# Patient Record
Sex: FEMALE | Race: BLACK OR AFRICAN AMERICAN | Employment: OTHER | URBAN - METROPOLITAN AREA
[De-identification: names, ages, dates, MRNs, and addresses within clinical notes are randomized per-mention and may not be internally consistent; named-entity substitution may affect disease eponyms.]

---

## 2021-03-05 NOTE — PAT NURSING NOTE
Patient agreed to have Type and screen done, but does not wish to receive any blood. Asked patient to call Dr. Kennedy Osvaldo office and inform them of her wish.

## 2021-03-07 ENCOUNTER — LAB ENCOUNTER (OUTPATIENT)
Dept: LAB | Facility: HOSPITAL | Age: 71
DRG: 472 | End: 2021-03-07
Attending: ORTHOPAEDIC SURGERY
Payer: MEDICARE

## 2021-03-07 DIAGNOSIS — Z01.818 PREOP TESTING: ICD-10-CM

## 2021-03-07 LAB
ANTIBODY SCREEN: POSITIVE
C ANTIGEN: NEGATIVE
DIRECT COOMBS POLY: NEGATIVE
E ANTIGEN: POSITIVE
FYA ANTIGEN: NEGATIVE
K ANTIGEN: NEGATIVE
LITTLE C ANTIGEN: POSITIVE
LITTLE E ANTIGEN: POSITIVE
M ANTIGEN: NEGATIVE
RH BLOOD TYPE: POSITIVE
S ANTIGEN: NEGATIVE

## 2021-03-07 PROCEDURE — 86905 BLOOD TYPING RBC ANTIGENS: CPT

## 2021-03-07 PROCEDURE — 86077 PHYS BLOOD BANK SERV XMATCH: CPT

## 2021-03-07 PROCEDURE — 86901 BLOOD TYPING SEROLOGIC RH(D): CPT

## 2021-03-07 PROCEDURE — 86870 RBC ANTIBODY IDENTIFICATION: CPT

## 2021-03-07 PROCEDURE — 36415 COLL VENOUS BLD VENIPUNCTURE: CPT

## 2021-03-07 PROCEDURE — 86900 BLOOD TYPING SEROLOGIC ABO: CPT

## 2021-03-07 PROCEDURE — 86850 RBC ANTIBODY SCREEN: CPT

## 2021-03-07 PROCEDURE — 86880 COOMBS TEST DIRECT: CPT

## 2021-03-08 LAB
RGTSCRN: 2
SARS-COV-2 RNA RESP QL NAA+PROBE: NOT DETECTED

## 2021-03-09 ENCOUNTER — ANESTHESIA EVENT (OUTPATIENT)
Dept: SURGERY | Facility: HOSPITAL | Age: 71
DRG: 472 | End: 2021-03-09
Payer: MEDICARE

## 2021-03-09 RX ORDER — OMEPRAZOLE 20 MG/1
40 CAPSULE, DELAYED RELEASE ORAL
COMMUNITY

## 2021-03-09 RX ORDER — METHOCARBAMOL 500 MG/1
500 TABLET, FILM COATED ORAL EVERY 8 HOURS PRN
Status: ON HOLD | COMMUNITY
End: 2021-03-13

## 2021-03-09 RX ORDER — GLUCOSAMINE HCL 500 MG
1 TABLET ORAL DAILY
COMMUNITY

## 2021-03-09 RX ORDER — INSULIN DEGLUDEC INJECTION 100 U/ML
8 INJECTION, SOLUTION SUBCUTANEOUS DAILY
Status: ON HOLD | COMMUNITY
End: 2021-03-19

## 2021-03-09 RX ORDER — LATANOPROST 50 UG/ML
1 SOLUTION/ DROPS OPHTHALMIC NIGHTLY
COMMUNITY

## 2021-03-10 ENCOUNTER — ANESTHESIA (OUTPATIENT)
Dept: SURGERY | Facility: HOSPITAL | Age: 71
DRG: 472 | End: 2021-03-10
Payer: MEDICARE

## 2021-03-10 ENCOUNTER — APPOINTMENT (OUTPATIENT)
Dept: GENERAL RADIOLOGY | Facility: HOSPITAL | Age: 71
DRG: 472 | End: 2021-03-10
Attending: ORTHOPAEDIC SURGERY
Payer: MEDICARE

## 2021-03-10 ENCOUNTER — HOSPITAL ENCOUNTER (INPATIENT)
Facility: HOSPITAL | Age: 71
LOS: 14 days | Discharge: NURSING, REHAB OR LONG-TERM CARE | DRG: 472 | End: 2021-03-24
Attending: ORTHOPAEDIC SURGERY | Admitting: ORTHOPAEDIC SURGERY
Payer: MEDICARE

## 2021-03-10 DIAGNOSIS — G95.9 CERVICAL MYELOPATHY (HCC): ICD-10-CM

## 2021-03-10 PROBLEM — M48.02 SPINAL STENOSIS IN CERVICAL REGION: Status: ACTIVE | Noted: 2021-03-10

## 2021-03-10 PROBLEM — M48.00 SPINAL STENOSIS: Status: ACTIVE | Noted: 2021-03-10

## 2021-03-10 LAB
ANION GAP SERPL CALC-SCNC: 5 MMOL/L (ref 0–18)
BUN BLD-MCNC: 17 MG/DL (ref 7–18)
BUN/CREAT SERPL: 17.3 (ref 10–20)
CALCIUM BLD-MCNC: 8 MG/DL (ref 8.5–10.1)
CHLORIDE SERPL-SCNC: 110 MMOL/L (ref 98–112)
CO2 SERPL-SCNC: 25 MMOL/L (ref 21–32)
CREAT BLD-MCNC: 0.98 MG/DL
DEPRECATED RDW RBC AUTO: 38 FL (ref 35.1–46.3)
ERYTHROCYTE [DISTWIDTH] IN BLOOD BY AUTOMATED COUNT: 11.3 % (ref 11–15)
EST. AVERAGE GLUCOSE BLD GHB EST-MCNC: 146 MG/DL (ref 68–126)
GLUCOSE BLD-MCNC: 138 MG/DL (ref 70–99)
GLUCOSE BLDC GLUCOMTR-MCNC: 122 MG/DL (ref 70–99)
GLUCOSE BLDC GLUCOMTR-MCNC: 144 MG/DL (ref 70–99)
GLUCOSE BLDC GLUCOMTR-MCNC: 146 MG/DL (ref 70–99)
GLUCOSE BLDC GLUCOMTR-MCNC: 188 MG/DL (ref 70–99)
GLUCOSE BLDC GLUCOMTR-MCNC: 193 MG/DL (ref 70–99)
GLUCOSE BLDC GLUCOMTR-MCNC: 200 MG/DL (ref 70–99)
HBA1C MFR BLD HPLC: 6.7 % (ref ?–5.7)
HCT VFR BLD AUTO: 27.3 %
HGB BLD-MCNC: 9.2 G/DL
MCH RBC QN AUTO: 31.6 PG (ref 26–34)
MCHC RBC AUTO-ENTMCNC: 33.7 G/DL (ref 31–37)
MCV RBC AUTO: 93.8 FL
OSMOLALITY SERPL CALC.SUM OF ELEC: 294 MOSM/KG (ref 275–295)
PLATELET # BLD AUTO: 258 10(3)UL (ref 150–450)
POCT HEMOCUE: 7.2 (ref 12–16)
POCT HEMOCUE: 8.1 (ref 12–16)
POCT HEMOCUE: 8.4 (ref 12–16)
POTASSIUM SERPL-SCNC: 4.1 MMOL/L (ref 3.5–5.1)
RBC # BLD AUTO: 2.91 X10(6)UL
SODIUM SERPL-SCNC: 140 MMOL/L (ref 136–145)
WBC # BLD AUTO: 4.9 X10(3) UL (ref 4–11)

## 2021-03-10 PROCEDURE — 85027 COMPLETE CBC AUTOMATED: CPT | Performed by: ORTHOPAEDIC SURGERY

## 2021-03-10 PROCEDURE — 0RG2071 FUSION OF 2 OR MORE CERVICAL VERTEBRAL JOINTS WITH AUTOLOGOUS TISSUE SUBSTITUTE, POSTERIOR APPROACH, POSTERIOR COLUMN, OPEN APPROACH: ICD-10-PCS | Performed by: ORTHOPAEDIC SURGERY

## 2021-03-10 PROCEDURE — 82962 GLUCOSE BLOOD TEST: CPT

## 2021-03-10 PROCEDURE — 4A11X4G MONITORING OF PERIPHERAL NERVOUS ELECTRICAL ACTIVITY, INTRAOPERATIVE, EXTERNAL APPROACH: ICD-10-PCS | Performed by: ORTHOPAEDIC SURGERY

## 2021-03-10 PROCEDURE — 95939 C MOTOR EVOKED UPR&LWR LIMBS: CPT | Performed by: ORTHOPAEDIC SURGERY

## 2021-03-10 PROCEDURE — 86922 COMPATIBILITY TEST ANTIGLOB: CPT

## 2021-03-10 PROCEDURE — 95938 SOMATOSENSORY TESTING: CPT | Performed by: ORTHOPAEDIC SURGERY

## 2021-03-10 PROCEDURE — 30233N1 TRANSFUSION OF NONAUTOLOGOUS RED BLOOD CELLS INTO PERIPHERAL VEIN, PERCUTANEOUS APPROACH: ICD-10-PCS | Performed by: ANESTHESIOLOGY

## 2021-03-10 PROCEDURE — 36430 TRANSFUSION BLD/BLD COMPNT: CPT | Performed by: ANESTHESIOLOGY

## 2021-03-10 PROCEDURE — C1713 ANCHOR/SCREW BN/BN,TIS/BN: HCPCS | Performed by: ORTHOPAEDIC SURGERY

## 2021-03-10 PROCEDURE — 83036 HEMOGLOBIN GLYCOSYLATED A1C: CPT | Performed by: ORTHOPAEDIC SURGERY

## 2021-03-10 PROCEDURE — 80048 BASIC METABOLIC PNL TOTAL CA: CPT | Performed by: ORTHOPAEDIC SURGERY

## 2021-03-10 PROCEDURE — 00NW0ZZ RELEASE CERVICAL SPINAL CORD, OPEN APPROACH: ICD-10-PCS | Performed by: ORTHOPAEDIC SURGERY

## 2021-03-10 PROCEDURE — 76000 FLUOROSCOPY <1 HR PHYS/QHP: CPT | Performed by: ORTHOPAEDIC SURGERY

## 2021-03-10 DEVICE — IMPLANTABLE DEVICE: Type: IMPLANTABLE DEVICE | Status: FUNCTIONAL

## 2021-03-10 RX ORDER — HYDROMORPHONE HYDROCHLORIDE 1 MG/ML
0.4 INJECTION, SOLUTION INTRAMUSCULAR; INTRAVENOUS; SUBCUTANEOUS EVERY 2 HOUR PRN
Status: DISCONTINUED | OUTPATIENT
Start: 2021-03-10 | End: 2021-03-11

## 2021-03-10 RX ORDER — SODIUM CHLORIDE, SODIUM LACTATE, POTASSIUM CHLORIDE, CALCIUM CHLORIDE 600; 310; 30; 20 MG/100ML; MG/100ML; MG/100ML; MG/100ML
INJECTION, SOLUTION INTRAVENOUS CONTINUOUS
Status: DISCONTINUED | OUTPATIENT
Start: 2021-03-10 | End: 2021-03-10 | Stop reason: ALTCHOICE

## 2021-03-10 RX ORDER — HYDROMORPHONE HYDROCHLORIDE 1 MG/ML
0.6 INJECTION, SOLUTION INTRAMUSCULAR; INTRAVENOUS; SUBCUTANEOUS EVERY 5 MIN PRN
Status: DISCONTINUED | OUTPATIENT
Start: 2021-03-10 | End: 2021-03-10 | Stop reason: HOSPADM

## 2021-03-10 RX ORDER — DEXTROSE MONOHYDRATE 25 G/50ML
50 INJECTION, SOLUTION INTRAVENOUS
Status: DISCONTINUED | OUTPATIENT
Start: 2021-03-10 | End: 2021-03-10 | Stop reason: HOSPADM

## 2021-03-10 RX ORDER — DIPHENHYDRAMINE HCL 25 MG
25 CAPSULE ORAL EVERY 4 HOURS PRN
Status: DISCONTINUED | OUTPATIENT
Start: 2021-03-10 | End: 2021-03-24

## 2021-03-10 RX ORDER — SODIUM CHLORIDE, SODIUM LACTATE, POTASSIUM CHLORIDE, CALCIUM CHLORIDE 600; 310; 30; 20 MG/100ML; MG/100ML; MG/100ML; MG/100ML
INJECTION, SOLUTION INTRAVENOUS CONTINUOUS
Status: DISCONTINUED | OUTPATIENT
Start: 2021-03-10 | End: 2021-03-10 | Stop reason: HOSPADM

## 2021-03-10 RX ORDER — GLYCOPYRROLATE 0.2 MG/ML
INJECTION, SOLUTION INTRAMUSCULAR; INTRAVENOUS AS NEEDED
Status: DISCONTINUED | OUTPATIENT
Start: 2021-03-10 | End: 2021-03-10 | Stop reason: SURG

## 2021-03-10 RX ORDER — INSULIN ASPART 100 [IU]/ML
INJECTION, SOLUTION INTRAVENOUS; SUBCUTANEOUS AS NEEDED
Status: DISCONTINUED | OUTPATIENT
Start: 2021-03-10 | End: 2021-03-10

## 2021-03-10 RX ORDER — HYDROMORPHONE HYDROCHLORIDE 1 MG/ML
0.8 INJECTION, SOLUTION INTRAMUSCULAR; INTRAVENOUS; SUBCUTANEOUS EVERY 2 HOUR PRN
Status: DISCONTINUED | OUTPATIENT
Start: 2021-03-10 | End: 2021-03-11

## 2021-03-10 RX ORDER — DEXAMETHASONE SODIUM PHOSPHATE 4 MG/ML
VIAL (ML) INJECTION AS NEEDED
Status: DISCONTINUED | OUTPATIENT
Start: 2021-03-10 | End: 2021-03-10 | Stop reason: SURG

## 2021-03-10 RX ORDER — MELATONIN
325
Status: DISCONTINUED | OUTPATIENT
Start: 2021-03-11 | End: 2021-03-24

## 2021-03-10 RX ORDER — PROCHLORPERAZINE EDISYLATE 5 MG/ML
5 INJECTION INTRAMUSCULAR; INTRAVENOUS ONCE AS NEEDED
Status: DISCONTINUED | OUTPATIENT
Start: 2021-03-10 | End: 2021-03-10 | Stop reason: HOSPADM

## 2021-03-10 RX ORDER — MECLIZINE HCL 12.5 MG/1
12.5 TABLET ORAL 3 TIMES DAILY PRN
Status: DISCONTINUED | OUTPATIENT
Start: 2021-03-10 | End: 2021-03-24

## 2021-03-10 RX ORDER — NALOXONE HYDROCHLORIDE 0.4 MG/ML
80 INJECTION, SOLUTION INTRAMUSCULAR; INTRAVENOUS; SUBCUTANEOUS AS NEEDED
Status: DISCONTINUED | OUTPATIENT
Start: 2021-03-10 | End: 2021-03-10 | Stop reason: HOSPADM

## 2021-03-10 RX ORDER — CEFAZOLIN SODIUM/WATER 2 G/20 ML
2 SYRINGE (ML) INTRAVENOUS EVERY 8 HOURS
Status: COMPLETED | OUTPATIENT
Start: 2021-03-10 | End: 2021-03-11

## 2021-03-10 RX ORDER — INSULIN ASPART 100 [IU]/ML
5 INJECTION, SOLUTION INTRAVENOUS; SUBCUTANEOUS
Status: DISCONTINUED | OUTPATIENT
Start: 2021-03-10 | End: 2021-03-10 | Stop reason: ALTCHOICE

## 2021-03-10 RX ORDER — DOCUSATE SODIUM 100 MG/1
100 CAPSULE, LIQUID FILLED ORAL 2 TIMES DAILY
Status: DISCONTINUED | OUTPATIENT
Start: 2021-03-10 | End: 2021-03-24

## 2021-03-10 RX ORDER — LATANOPROST 50 UG/ML
1 SOLUTION/ DROPS OPHTHALMIC NIGHTLY
Status: DISCONTINUED | OUTPATIENT
Start: 2021-03-10 | End: 2021-03-13

## 2021-03-10 RX ORDER — ROCURONIUM BROMIDE 10 MG/ML
INJECTION, SOLUTION INTRAVENOUS AS NEEDED
Status: DISCONTINUED | OUTPATIENT
Start: 2021-03-10 | End: 2021-03-10 | Stop reason: SURG

## 2021-03-10 RX ORDER — PROCHLORPERAZINE EDISYLATE 5 MG/ML
10 INJECTION INTRAMUSCULAR; INTRAVENOUS EVERY 6 HOURS PRN
Status: ACTIVE | OUTPATIENT
Start: 2021-03-10 | End: 2021-03-12

## 2021-03-10 RX ORDER — DEXTROSE MONOHYDRATE 25 G/50ML
50 INJECTION, SOLUTION INTRAVENOUS
Status: DISCONTINUED | OUTPATIENT
Start: 2021-03-10 | End: 2021-03-24

## 2021-03-10 RX ORDER — HYDROCODONE BITARTRATE AND ACETAMINOPHEN 5; 325 MG/1; MG/1
1 TABLET ORAL AS NEEDED
Status: DISCONTINUED | OUTPATIENT
Start: 2021-03-10 | End: 2021-03-10 | Stop reason: HOSPADM

## 2021-03-10 RX ORDER — ACETAMINOPHEN 325 MG/1
650 TABLET ORAL EVERY 4 HOURS PRN
Status: DISCONTINUED | OUTPATIENT
Start: 2021-03-10 | End: 2021-03-11

## 2021-03-10 RX ORDER — BISACODYL 10 MG
10 SUPPOSITORY, RECTAL RECTAL
Status: DISCONTINUED | OUTPATIENT
Start: 2021-03-10 | End: 2021-03-24

## 2021-03-10 RX ORDER — ONDANSETRON 2 MG/ML
INJECTION INTRAMUSCULAR; INTRAVENOUS AS NEEDED
Status: DISCONTINUED | OUTPATIENT
Start: 2021-03-10 | End: 2021-03-10 | Stop reason: SURG

## 2021-03-10 RX ORDER — SODIUM PHOSPHATE, DIBASIC AND SODIUM PHOSPHATE, MONOBASIC 7; 19 G/133ML; G/133ML
1 ENEMA RECTAL ONCE AS NEEDED
Status: DISCONTINUED | OUTPATIENT
Start: 2021-03-10 | End: 2021-03-24

## 2021-03-10 RX ORDER — HYDROCODONE BITARTRATE AND ACETAMINOPHEN 5; 325 MG/1; MG/1
2 TABLET ORAL AS NEEDED
Status: DISCONTINUED | OUTPATIENT
Start: 2021-03-10 | End: 2021-03-10 | Stop reason: HOSPADM

## 2021-03-10 RX ORDER — MIDAZOLAM HYDROCHLORIDE 1 MG/ML
INJECTION INTRAMUSCULAR; INTRAVENOUS AS NEEDED
Status: DISCONTINUED | OUTPATIENT
Start: 2021-03-10 | End: 2021-03-10 | Stop reason: SURG

## 2021-03-10 RX ORDER — VANCOMYCIN HYDROCHLORIDE 1 G/20ML
INJECTION, POWDER, LYOPHILIZED, FOR SOLUTION INTRAVENOUS AS NEEDED
Status: DISCONTINUED | OUTPATIENT
Start: 2021-03-10 | End: 2021-03-10

## 2021-03-10 RX ORDER — HYDROMORPHONE HYDROCHLORIDE 1 MG/ML
0.2 INJECTION, SOLUTION INTRAMUSCULAR; INTRAVENOUS; SUBCUTANEOUS EVERY 2 HOUR PRN
Status: DISCONTINUED | OUTPATIENT
Start: 2021-03-10 | End: 2021-03-11

## 2021-03-10 RX ORDER — OXYCODONE HYDROCHLORIDE AND ACETAMINOPHEN 5; 325 MG/1; MG/1
2 TABLET ORAL EVERY 4 HOURS PRN
Status: DISCONTINUED | OUTPATIENT
Start: 2021-03-10 | End: 2021-03-11

## 2021-03-10 RX ORDER — SODIUM CHLORIDE 9 MG/ML
INJECTION, SOLUTION INTRAVENOUS CONTINUOUS PRN
Status: DISCONTINUED | OUTPATIENT
Start: 2021-03-10 | End: 2021-03-10 | Stop reason: SURG

## 2021-03-10 RX ORDER — ONDANSETRON 2 MG/ML
4 INJECTION INTRAMUSCULAR; INTRAVENOUS EVERY 4 HOURS PRN
Status: DISPENSED | OUTPATIENT
Start: 2021-03-10 | End: 2021-03-12

## 2021-03-10 RX ORDER — LIDOCAINE HYDROCHLORIDE 10 MG/ML
INJECTION, SOLUTION EPIDURAL; INFILTRATION; INTRACAUDAL; PERINEURAL AS NEEDED
Status: DISCONTINUED | OUTPATIENT
Start: 2021-03-10 | End: 2021-03-10 | Stop reason: SURG

## 2021-03-10 RX ORDER — HYDROMORPHONE HYDROCHLORIDE 1 MG/ML
0.4 INJECTION, SOLUTION INTRAMUSCULAR; INTRAVENOUS; SUBCUTANEOUS EVERY 5 MIN PRN
Status: DISCONTINUED | OUTPATIENT
Start: 2021-03-10 | End: 2021-03-10 | Stop reason: HOSPADM

## 2021-03-10 RX ORDER — BUPIVACAINE HYDROCHLORIDE 5 MG/ML
INJECTION, SOLUTION EPIDURAL; INTRACAUDAL AS NEEDED
Status: DISCONTINUED | OUTPATIENT
Start: 2021-03-10 | End: 2021-03-10

## 2021-03-10 RX ORDER — BACLOFEN 10 MG/1
10 TABLET ORAL EVERY 8 HOURS PRN
Status: DISCONTINUED | OUTPATIENT
Start: 2021-03-10 | End: 2021-03-24

## 2021-03-10 RX ORDER — ONDANSETRON 2 MG/ML
4 INJECTION INTRAMUSCULAR; INTRAVENOUS ONCE AS NEEDED
Status: DISCONTINUED | OUTPATIENT
Start: 2021-03-10 | End: 2021-03-10 | Stop reason: HOSPADM

## 2021-03-10 RX ORDER — PHENYLEPHRINE HCL 10 MG/ML
VIAL (ML) INJECTION AS NEEDED
Status: DISCONTINUED | OUTPATIENT
Start: 2021-03-10 | End: 2021-03-10 | Stop reason: SURG

## 2021-03-10 RX ORDER — ACETAMINOPHEN 500 MG
1000 TABLET ORAL ONCE
Status: COMPLETED | OUTPATIENT
Start: 2021-03-10 | End: 2021-03-10

## 2021-03-10 RX ORDER — HYDROMORPHONE HYDROCHLORIDE 1 MG/ML
0.2 INJECTION, SOLUTION INTRAMUSCULAR; INTRAVENOUS; SUBCUTANEOUS EVERY 5 MIN PRN
Status: DISCONTINUED | OUTPATIENT
Start: 2021-03-10 | End: 2021-03-10 | Stop reason: HOSPADM

## 2021-03-10 RX ORDER — DIPHENHYDRAMINE HYDROCHLORIDE 50 MG/ML
25 INJECTION INTRAMUSCULAR; INTRAVENOUS EVERY 4 HOURS PRN
Status: DISCONTINUED | OUTPATIENT
Start: 2021-03-10 | End: 2021-03-24

## 2021-03-10 RX ORDER — POLYETHYLENE GLYCOL 3350 17 G/17G
17 POWDER, FOR SOLUTION ORAL DAILY PRN
Status: DISCONTINUED | OUTPATIENT
Start: 2021-03-10 | End: 2021-03-24

## 2021-03-10 RX ORDER — PANTOPRAZOLE SODIUM 20 MG/1
20 TABLET, DELAYED RELEASE ORAL
Status: DISCONTINUED | OUTPATIENT
Start: 2021-03-11 | End: 2021-03-24

## 2021-03-10 RX ORDER — SENNOSIDES 8.6 MG
17.2 TABLET ORAL NIGHTLY
Status: DISCONTINUED | OUTPATIENT
Start: 2021-03-10 | End: 2021-03-24

## 2021-03-10 RX ORDER — LIDOCAINE HYDROCHLORIDE AND EPINEPHRINE 10; 10 MG/ML; UG/ML
INJECTION, SOLUTION INFILTRATION; PERINEURAL AS NEEDED
Status: DISCONTINUED | OUTPATIENT
Start: 2021-03-10 | End: 2021-03-10

## 2021-03-10 RX ORDER — CEFAZOLIN SODIUM/WATER 2 G/20 ML
2 SYRINGE (ML) INTRAVENOUS ONCE
Status: COMPLETED | OUTPATIENT
Start: 2021-03-10 | End: 2021-03-10

## 2021-03-10 RX ORDER — SCOLOPAMINE TRANSDERMAL SYSTEM 1 MG/1
1 PATCH, EXTENDED RELEASE TRANSDERMAL ONCE
Status: DISCONTINUED | OUTPATIENT
Start: 2021-03-10 | End: 2021-03-13

## 2021-03-10 RX ORDER — SODIUM CHLORIDE, SODIUM LACTATE, POTASSIUM CHLORIDE, CALCIUM CHLORIDE 600; 310; 30; 20 MG/100ML; MG/100ML; MG/100ML; MG/100ML
INJECTION, SOLUTION INTRAVENOUS CONTINUOUS
Status: DISCONTINUED | OUTPATIENT
Start: 2021-03-10 | End: 2021-03-12

## 2021-03-10 RX ORDER — OXYCODONE HYDROCHLORIDE AND ACETAMINOPHEN 5; 325 MG/1; MG/1
1 TABLET ORAL EVERY 4 HOURS PRN
Status: DISCONTINUED | OUTPATIENT
Start: 2021-03-10 | End: 2021-03-11

## 2021-03-10 RX ADMIN — PHENYLEPHRINE HCL 50 MCG: 10 MG/ML VIAL (ML) INJECTION at 09:00:00

## 2021-03-10 RX ADMIN — PHENYLEPHRINE HCL 100 MCG: 10 MG/ML VIAL (ML) INJECTION at 07:59:00

## 2021-03-10 RX ADMIN — SODIUM CHLORIDE: 9 INJECTION, SOLUTION INTRAVENOUS at 10:30:00

## 2021-03-10 RX ADMIN — SODIUM CHLORIDE, SODIUM LACTATE, POTASSIUM CHLORIDE, CALCIUM CHLORIDE: 600; 310; 30; 20 INJECTION, SOLUTION INTRAVENOUS at 09:23:00

## 2021-03-10 RX ADMIN — PHENYLEPHRINE HCL 50 MCG: 10 MG/ML VIAL (ML) INJECTION at 08:12:00

## 2021-03-10 RX ADMIN — LIDOCAINE HYDROCHLORIDE 50 MG: 10 INJECTION, SOLUTION EPIDURAL; INFILTRATION; INTRACAUDAL; PERINEURAL at 07:45:00

## 2021-03-10 RX ADMIN — DEXAMETHASONE SODIUM PHOSPHATE 4 MG: 4 MG/ML VIAL (ML) INJECTION at 07:59:00

## 2021-03-10 RX ADMIN — SODIUM CHLORIDE: 9 INJECTION, SOLUTION INTRAVENOUS at 11:36:00

## 2021-03-10 RX ADMIN — ROCURONIUM BROMIDE 5 MG: 10 INJECTION, SOLUTION INTRAVENOUS at 07:45:00

## 2021-03-10 RX ADMIN — SODIUM CHLORIDE, SODIUM LACTATE, POTASSIUM CHLORIDE, CALCIUM CHLORIDE: 600; 310; 30; 20 INJECTION, SOLUTION INTRAVENOUS at 11:36:00

## 2021-03-10 RX ADMIN — SODIUM CHLORIDE: 9 INJECTION, SOLUTION INTRAVENOUS at 07:55:00

## 2021-03-10 RX ADMIN — CEFAZOLIN SODIUM/WATER 2 G: 2 G/20 ML SYRINGE (ML) INTRAVENOUS at 11:58:00

## 2021-03-10 RX ADMIN — MIDAZOLAM HYDROCHLORIDE 2 MG: 1 INJECTION INTRAMUSCULAR; INTRAVENOUS at 07:39:00

## 2021-03-10 RX ADMIN — PHENYLEPHRINE HCL 50 MCG: 10 MG/ML VIAL (ML) INJECTION at 11:03:00

## 2021-03-10 RX ADMIN — PHENYLEPHRINE HCL 50 MCG: 10 MG/ML VIAL (ML) INJECTION at 09:22:00

## 2021-03-10 RX ADMIN — GLYCOPYRROLATE 0.1 MG: 0.2 INJECTION, SOLUTION INTRAMUSCULAR; INTRAVENOUS at 07:45:00

## 2021-03-10 RX ADMIN — SODIUM CHLORIDE, SODIUM LACTATE, POTASSIUM CHLORIDE, CALCIUM CHLORIDE: 600; 310; 30; 20 INJECTION, SOLUTION INTRAVENOUS at 07:36:00

## 2021-03-10 RX ADMIN — CEFAZOLIN SODIUM/WATER 2 G: 2 G/20 ML SYRINGE (ML) INTRAVENOUS at 08:00:00

## 2021-03-10 RX ADMIN — ONDANSETRON 4 MG: 2 INJECTION INTRAMUSCULAR; INTRAVENOUS at 07:59:00

## 2021-03-10 RX ADMIN — PHENYLEPHRINE HCL 100 MCG: 10 MG/ML VIAL (ML) INJECTION at 08:15:00

## 2021-03-10 NOTE — PHYSICAL THERAPY NOTE
Orders received, chart reviewed. Attempted to see pt for PT eval. Pt greeted in bed with RN present. Pt lethargic and in pain, unable to participate in therapy at this time. Will f/u tomorrow for PT eval as appropriate.     Aaliyah Melchor, DPT  Physical Th

## 2021-03-10 NOTE — PAYOR COMM NOTE
--------------  ADMISSION REVIEW     Silvia Walker MA [facility]  Subscriber #:  [id number]  Authorization Number: [id number]    Admit date: 3/10/21  Admit time:  5:53 AM     Admitting Physician: Adilene Sheridan MD  Attending Physician:  Adilene Sheridan MD  Primary Car plan as detailed above.  Discussed plan of care with Dr. Ramonia Kayser RN, NP  3/10/2021    HISTORY:   CC: consult from Dr Mary Kate Hood for post op medical management      History of Present Illness:  Hx obtained via chart review  79 yo female with hx gastric cervical stenosis/myelopathy who is S/P C4-T1 laminectomy and fusion.  Post op course with acute on chronic anemia, S/P 1 unit PRBC's in OR, post op groggy, notes pain in neck, following commands, no chest pain, no nausea or vomiting    objective  /84 neuromonitoring throughout case.     Specimen: none     Estimated Blood Loss: Blood Output: 800 mL (3/10/2021 11:35 AM)    Chris Vanegas MD  3/10/2021  11:58 AM    Anesthesia Plan:   ASA:  3  Plan:   General  Monitors and Lines:   A-line, BIS and Additonal Date Action Dose Route User    3/10/2021 1044 Given 3 Units Intravenous Analia Helton CRNA      lactated ringers infusion     Date Action Dose Route User    3/10/2021 9508 New Bag (none) Intravenous Analia Helton CRNA    3/10/2021 [address] Rate/Dose Change 120 mcg/kg/min × 71.7 kg Intravenous Analia Danie, CRNA    3/10/2021 5025 Rate/Dose Change 100 mcg/kg/min × 71.7 kg Intravenous Analia Danie, CRNA    3/10/2021 0809 Rate/Dose Change 70 mcg/kg/min × 71.7 kg Intravenous Joanne Santa,

## 2021-03-10 NOTE — ANESTHESIA PREPROCEDURE EVALUATION
Anesthesia PreOp Note    HPI:     Beto Yip is a 78year old female who presents for preoperative consultation requested by: Rex Quinn MD    Date of Surgery: 3/10/2021    Procedure(s):  Cervical 4 -Thoracic 1 laminectomy & posterior spinal fusio Disp: , Rfl: , 3/5/2021  Chlorhexidine Gluconate 4 % External Liquid, Wash with chlorhexidine gluconate 4% at the surgical site 5-7 days prior to surgery.  Posterior upper back and neck, Disp: 1 Bottle, Rfl: 0  Insulin Aspart Pen 100 UNIT/ML Subcutaneous So Socioeconomic History      Marital status:        Spouse name: Not on file      Number of children: Not on file      Years of education: Not on file      Highest education level: Not on file    Occupational History      Not on file    Tobacco Use 78. Her respiration is 18 and oxygen saturation is 100%.     03/09/21  1146 03/10/21  0608   BP:  138/76   Pulse:  76   Resp:  18   Temp:  98.1 °F (36.7 °C)   TempSrc:  Oral   SpO2:  100%   Weight: 70.8 kg (156 lb) 71.7 kg (158 lb)   Height: 1.702 m (5' 7\"

## 2021-03-10 NOTE — ANESTHESIA PROCEDURE NOTES
Airway  Date/Time: 3/10/2021 7:47 AM  Urgency: Elective    Airway not difficult    General Information and Staff    Patient location during procedure: OR  Anesthesiologist: Hugh Kincaid MD  Resident/CRNA: Lizzie Maria CRNA  Performed: CRNA

## 2021-03-10 NOTE — H&P
[facility] Hospitalist Team  Consult  Admit Date:  3/10/21    ASSESSMENT / PLAN:   79 yo female with hx gastric bypass, b12 def, anemia, HL-statin intolerant, GERD, OAB, DM type II and severe cervical stenosis/myelopathy who is S/P C4-T1 laminectomy and fusion.  P arterial line but not to command. Pt got 1 unit of blood in OR for hgb 7.2, current hgb 9.4. Unable to get rest of hx/ROS due to cognitive state.      OBJECTIVE:  /76 (BP Location: Right arm)   Pulse 76   Temp 98.1 °F (36.7 °C) (Oral)   Resp 18   Ht 5 eyes nightly., Disp: , Rfl:   omeprazole 20 MG Oral Capsule Delayed Release, Take 40 mg by mouth every morning before breakfast., Disp: , Rfl:   Prenatal Vit-Fe Fumarate-FA (PRENATAL VITAMIN PLUS LOW IRON OR), Take 1 tablet by mouth daily. , Disp: , Rfl: and severe cervical stenosis/myelopathy who is S/P C4-T1 laminectomy and fusion.  Post op course with acute on chronic anemia, S/P 1 unit PRBC's in OR, post op groggy, notes pain in neck, following commands, no chest pain, no nausea or vomiting    objective

## 2021-03-10 NOTE — ANESTHESIA POSTPROCEDURE EVALUATION
Patient: Marlon Vasquez    Procedure Summary     Date: 03/10/21 Room / Location: [address] [place] [address] MAIN OR 11 / [address] [facility] MAIN OR    Anesthesia Start: 1949 Anesthesia Stop: 7749    Procedure: Cervical 4 -Thoracic 1 laminectomy & posterior spinal fusion (N/A Spine Cervical)

## 2021-03-10 NOTE — BRIEF OP NOTE
Pre-Operative Diagnosis: Cervical myelopathy (HCC) [G95.9]     Post-Operative Diagnosis: same     Procedure Performed:   Procedure(s):  Cervical 4 -Thoracic 1 laminectomy & posterior spinal fusion    Surgeon(s) and Role:     * Sara Stanton MD - Primary

## 2021-03-10 NOTE — H&P
CC:Symptomatic and progressive advanced myelopathy with severe cervical spinal stenosis     HPI: Milvia Merlos 78year old female reports symptomatic and progressive advanced myelopathy with severe cervical spinal stenosis.  Continued left upper extremit on file      Comment: rare    Drug use: Never         A comprehensive 10 point review of systems was completed.   Pertinent positives and negatives noted in the the HPI.     03/10/21  0608   BP: 138/76   Pulse: 76   Resp: 18   Temp: 98.1 °F (36.7 °C)

## 2021-03-10 NOTE — ANESTHESIA PROCEDURE NOTES
Arterial Line  Performed by: Dhiraj Sorenson CRNA  Authorized by: Wilton Kincaid MD     General Information and Staff    Procedure Start:  3/10/2021 7:50 AM  Procedure End:  3/10/2021 7:52 AM  Anesthesiologist:  Wilton Kincaid MD  CRNA:  Rosa Seth

## 2021-03-10 NOTE — PLAN OF CARE
Patient is alert and oriented X 2-3 after orientation. VS are WNL. She has hx of TANIA, tele in place. She is up as tolerated but resting comfortably in bed. Has c-collar in place and to remain on at all times per order.  Dressing is CDI and hemovac in place Progressing     Problem: Delirium  Goal: Minimize duration of delirium  Description: Interventions:  - Encourage use of hearing aids, eye glasses  - Promote highest level of mobility daily  - Provide frequent reorientation  - Promote wakefulness i.e. light

## 2021-03-11 ENCOUNTER — APPOINTMENT (OUTPATIENT)
Dept: GENERAL RADIOLOGY | Facility: HOSPITAL | Age: 71
DRG: 472 | End: 2021-03-11
Attending: ORTHOPAEDIC SURGERY
Payer: MEDICARE

## 2021-03-11 LAB
ANION GAP SERPL CALC-SCNC: 4 MMOL/L (ref 0–18)
BLOOD TYPE BARCODE: 5100
BUN BLD-MCNC: 14 MG/DL (ref 7–18)
BUN/CREAT SERPL: 14.6 (ref 10–20)
CALCIUM BLD-MCNC: 8.3 MG/DL (ref 8.5–10.1)
CHLORIDE SERPL-SCNC: 107 MMOL/L (ref 98–112)
CO2 SERPL-SCNC: 29 MMOL/L (ref 21–32)
CREAT BLD-MCNC: 0.96 MG/DL
DEPRECATED RDW RBC AUTO: 37.9 FL (ref 35.1–46.3)
ERYTHROCYTE [DISTWIDTH] IN BLOOD BY AUTOMATED COUNT: 11.1 % (ref 11–15)
EST. AVERAGE GLUCOSE BLD GHB EST-MCNC: 146 MG/DL (ref 68–126)
GLUCOSE BLD-MCNC: 114 MG/DL (ref 70–99)
GLUCOSE BLDC GLUCOMTR-MCNC: 163 MG/DL (ref 70–99)
GLUCOSE BLDC GLUCOMTR-MCNC: 195 MG/DL (ref 70–99)
GLUCOSE BLDC GLUCOMTR-MCNC: 249 MG/DL (ref 70–99)
HBA1C MFR BLD HPLC: 6.7 % (ref ?–5.7)
HCT VFR BLD AUTO: 27 %
HGB BLD-MCNC: 8.9 G/DL
MCH RBC QN AUTO: 31.1 PG (ref 26–34)
MCHC RBC AUTO-ENTMCNC: 33 G/DL (ref 31–37)
MCV RBC AUTO: 94.4 FL
OSMOLALITY SERPL CALC.SUM OF ELEC: 291 MOSM/KG (ref 275–295)
PLATELET # BLD AUTO: 243 10(3)UL (ref 150–450)
POTASSIUM SERPL-SCNC: 4.2 MMOL/L (ref 3.5–5.1)
RBC # BLD AUTO: 2.86 X10(6)UL
SODIUM SERPL-SCNC: 140 MMOL/L (ref 136–145)
WBC # BLD AUTO: 8.8 X10(3) UL (ref 4–11)

## 2021-03-11 PROCEDURE — 85027 COMPLETE CBC AUTOMATED: CPT | Performed by: ORTHOPAEDIC SURGERY

## 2021-03-11 PROCEDURE — 97530 THERAPEUTIC ACTIVITIES: CPT

## 2021-03-11 PROCEDURE — 72040 X-RAY EXAM NECK SPINE 2-3 VW: CPT | Performed by: ORTHOPAEDIC SURGERY

## 2021-03-11 PROCEDURE — 97166 OT EVAL MOD COMPLEX 45 MIN: CPT

## 2021-03-11 PROCEDURE — 80048 BASIC METABOLIC PNL TOTAL CA: CPT | Performed by: ORTHOPAEDIC SURGERY

## 2021-03-11 PROCEDURE — 83036 HEMOGLOBIN GLYCOSYLATED A1C: CPT | Performed by: NURSE PRACTITIONER

## 2021-03-11 PROCEDURE — 97162 PT EVAL MOD COMPLEX 30 MIN: CPT

## 2021-03-11 PROCEDURE — 82962 GLUCOSE BLOOD TEST: CPT

## 2021-03-11 RX ORDER — HYDROMORPHONE HYDROCHLORIDE 1 MG/ML
0.2 INJECTION, SOLUTION INTRAMUSCULAR; INTRAVENOUS; SUBCUTANEOUS EVERY 2 HOUR PRN
Status: DISCONTINUED | OUTPATIENT
Start: 2021-03-11 | End: 2021-03-12

## 2021-03-11 RX ORDER — TRAMADOL HYDROCHLORIDE 50 MG/1
50 TABLET ORAL EVERY 6 HOURS PRN
Status: DISCONTINUED | OUTPATIENT
Start: 2021-03-11 | End: 2021-03-24

## 2021-03-11 RX ORDER — ACETAMINOPHEN 500 MG
1000 TABLET ORAL EVERY 8 HOURS
Status: DISCONTINUED | OUTPATIENT
Start: 2021-03-11 | End: 2021-03-24

## 2021-03-11 NOTE — PROGRESS NOTES
SUBJECTIVE:    Alma Henao is a 78year old female  Pain at surgical site. However she does not want opioids. Hands feel slightly better than pre-op.     OBJECTIVE:     03/11/21  0836   BP: 149/82   Pulse:    Resp: 16   Temp: 98.9 °F (37.2 °C)     Out

## 2021-03-11 NOTE — PHYSICAL THERAPY NOTE
PHYSICAL THERAPY EVALUATION - INPATIENT     Room Number: 435/435-A  Evaluation Date: 3/11/2021  Type of Evaluation: Initial   Physician Order: PT Eval and Treat    Presenting Problem: Myelopathy , spinal stenosis .  Pt is s/p C 4 -T1 Laminectomy and fusion below the patient's pre-admission status. Pt with stable vitals this session. Pt with mod to max assist of 2 for supine to sit emphasis on log roll spine sparing proper sequencing.  Pt upon sitting with trunk lean with need for cues and therapy suppo consult. Pt does not demonstrate physical skills to return to indep living. Pt with a goal for home. Pt lives alone and currently stated does not have anyone to assist her at home.   DC Plans will be pending progress with therapy , support in the milo home oral medications, insulin-medium dose SSI prn, levemir 10 and 5 aspart TID with meals   -accuchecks  -ADA diet      GERD  -protonix for home omeprazole     OAB  -hold mirabegron        Rest as above with above            Problem List  Active Problems: Management Techniques: Activity promotion; Body mechanics;Breathing techniques;Relaxation;Repositioning    COGNITION  · Overall Cognitive Status:  WFL - within functional limits     Decrease insight into current deficits and decrease safety awareness (including adjusting bedclothes, sheets and blankets)?: A Lot   -   Sitting down on and standing up from a chair with arms (e.g., wheelchair, bedside commode, etc.): A Lot   -   Moving from lying on back to sitting on the side of the bed?: A Lot   How much #5 Pt to verbalize spine precautions and maintain proper spine sparing sequencing with fxn mobility    Goal #5   Current Status    Goal #6 Family training pending DC plan , if able to progress to home.       Goal #6  Current Status

## 2021-03-11 NOTE — OCCUPATIONAL THERAPY NOTE
OCCUPATIONAL THERAPY EVALUATION - INPATIENT      Room Number: 435/435-A  Evaluation Date: 3/11/2021  Type of Evaluation: Initial       Physician Order: IP Consult to Occupational Therapy  Reason for Therapy: ADL/IADL Dysfunction and Discharge Planning    O placement. Within 10 seconds of standing , pt returned herself to sitting EOB and was not able to be persuaded to complete further mobility. Pt asking to return to supine.  Pt's upper extremity strength and range of motion are [facility] for oral facial hygiene an cpap since gastric bypass 8 years ago   • Vertigo        Past Surgical History  Past Surgical History:   Procedure Laterality Date   • CERVICAL SPINE SURGERY  03/10/2021    c4-t1 laminectomy and fusion-Dr Sergei Rice   • CHOLECYSTECTOMY     • D & C     • LAP GASTR ASSESSMENT  Static Sitting: poor+  Dynamic Sitting: NT  Static Standing: poor+  Dynamic Standing: NT    FUNCTIONAL ADL ASSESSMENT  Grooming: Min A for sitting balance  Feeding:set-up in support sitting   Bathing: NT  Toileting: total A purewick  Upper Body

## 2021-03-11 NOTE — PLAN OF CARE
Problem: Diabetes/Glucose Control  Goal: Glucose maintained within prescribed range  Description: INTERVENTIONS:  - Monitor Blood Glucose as ordered  - Assess for signs and symptoms of hyperglycemia and hypoglycemia  - Administer ordered medications to m devices as appropriate  - Consider OT/PT consult to assist with strengthening/mobility  - Encourage toileting schedule  Outcome: Progressing     Problem: DISCHARGE PLANNING  Goal: Discharge to home or other facility with appropriate resources  Description:

## 2021-03-11 NOTE — PROGRESS NOTES
[facility] Hospitalist Team  Progress Note    Hartland Means Patient Status:  Inpatient    1950 MRN [id number]   Location [facility] 4W/SW/SE Attending Edwin Montenegro MD   Hosp Day # 1 PCP No primary care provider on file.      CC: Follow Up  PCP: Reluctant to rehab      OBJECTIVE:   Blood pressure 149/82, pulse 91, temperature 98.9 °F (37.2 °C), temperature source Oral, resp. rate 16, height 5' 7\" (1.702 m), weight 158 lb (71.7 kg), SpO2 96 %.     GENERAL: no apparent distress  NEURO: A/A Ox3  HEEN Q15 Min PRN  Insulin Aspart Pen (NOVOLOG) 100 UNIT/ML flexpen 1-7 Units, 1-7 Units, Subcutaneous, TID CC  lactated ringers infusion, , Intravenous, Continuous  Senna (SENOKOT) tab 17.2 mg, 17.2 mg, Oral, Nightly  docusate sodium (COLACE) cap 100 mg, 100 mg x3  Neck:brace in place  Pulm: Lungs clear, normal respiratory effort,   CV: Heart with regular rate and rhythm, No murmurs, rubs, gallops  Abd: Abdomen soft, nontender, nondistended,    MSK:  no clubbing, no cyanosis.   No Lower extremity edema  Skin: no r

## 2021-03-11 NOTE — OPERATIVE REPORT
[facility]    PATIENT'S NAME: Norma Child   ATTENDING PHYSICIAN: Fady Boudreaux. Nicole Vazquez MD   OPERATING PHYSICIAN: Zhen Vazquez MD   PATIENT ACCOUNT#:   [id number]    LOCATION:  [address] [place], [place] [address] RECORD #:   [id number]       DATE OF BIRTH:  [birth year] of patient's symptoms, conservative treatment is no longer indicated. Risks, alternatives, and benefits of surgery were discussed with the patient, and she wishes to proceed. Surgery was delayed somewhat due to preoperative medical clearance.   Patient wi to T1. Lateral masses were instrumented at C4, C5, and C6 bilaterally using anatomic landmarks, as well as fluoroscopy. Instrumentation was from the [employer] system, and screw sizes ranged from 12 to 14 mm, with excellent purchase noted.   T1 pedicle superficial to the fascia. Deep dermal layer closed with 0 Vicryl in interrupted fashion. subcutaneous tissue layer was closed with 2-0 running monofilament absorbable suture. Skin was closed with 3-0 nylon in horizontal mattress fashion.   Skin incision

## 2021-03-11 NOTE — PHYSICAL THERAPY NOTE
Chart reviewed , RN approve participation. Pt received supine in bed with aspen collar in place with possible need for some adjustment on observation.         Therapy attempt in AM , pt declining therapy participation this PM stating she just got back from

## 2021-03-12 LAB
ANION GAP SERPL CALC-SCNC: 5 MMOL/L (ref 0–18)
BUN BLD-MCNC: 13 MG/DL (ref 7–18)
BUN/CREAT SERPL: 16.3 (ref 10–20)
CALCIUM BLD-MCNC: 8.9 MG/DL (ref 8.5–10.1)
CHLORIDE SERPL-SCNC: 104 MMOL/L (ref 98–112)
CO2 SERPL-SCNC: 29 MMOL/L (ref 21–32)
CREAT BLD-MCNC: 0.8 MG/DL
DEPRECATED RDW RBC AUTO: 37.4 FL (ref 35.1–46.3)
ERYTHROCYTE [DISTWIDTH] IN BLOOD BY AUTOMATED COUNT: 11 % (ref 11–15)
GLUCOSE BLD-MCNC: 180 MG/DL (ref 70–99)
GLUCOSE BLDC GLUCOMTR-MCNC: 162 MG/DL (ref 70–99)
GLUCOSE BLDC GLUCOMTR-MCNC: 179 MG/DL (ref 70–99)
GLUCOSE BLDC GLUCOMTR-MCNC: 197 MG/DL (ref 70–99)
GLUCOSE BLDC GLUCOMTR-MCNC: 315 MG/DL (ref 70–99)
HCT VFR BLD AUTO: 29 %
HGB BLD-MCNC: 9.8 G/DL
MCH RBC QN AUTO: 31.8 PG (ref 26–34)
MCHC RBC AUTO-ENTMCNC: 33.8 G/DL (ref 31–37)
MCV RBC AUTO: 94.2 FL
OSMOLALITY SERPL CALC.SUM OF ELEC: 291 MOSM/KG (ref 275–295)
PLATELET # BLD AUTO: 245 10(3)UL (ref 150–450)
POTASSIUM SERPL-SCNC: 4.1 MMOL/L (ref 3.5–5.1)
RBC # BLD AUTO: 3.08 X10(6)UL
SODIUM SERPL-SCNC: 138 MMOL/L (ref 136–145)
WBC # BLD AUTO: 10.6 X10(3) UL (ref 4–11)

## 2021-03-12 PROCEDURE — 97116 GAIT TRAINING THERAPY: CPT

## 2021-03-12 PROCEDURE — 80048 BASIC METABOLIC PNL TOTAL CA: CPT | Performed by: ORTHOPAEDIC SURGERY

## 2021-03-12 PROCEDURE — 97530 THERAPEUTIC ACTIVITIES: CPT

## 2021-03-12 PROCEDURE — 97535 SELF CARE MNGMENT TRAINING: CPT

## 2021-03-12 PROCEDURE — 82962 GLUCOSE BLOOD TEST: CPT

## 2021-03-12 PROCEDURE — 85027 COMPLETE CBC AUTOMATED: CPT | Performed by: ORTHOPAEDIC SURGERY

## 2021-03-12 RX ORDER — ACETAMINOPHEN AND CODEINE PHOSPHATE 300; 30 MG/1; MG/1
1 TABLET ORAL EVERY 6 HOURS PRN
Status: DISCONTINUED | OUTPATIENT
Start: 2021-03-12 | End: 2021-03-24

## 2021-03-12 NOTE — PHYSICAL THERAPY NOTE
PHYSICAL THERAPY TREATMENT NOTE - INPATIENT     Room Number: 435/435-A       Presenting Problem: Myelopathy , spinal stenosis . Pt is s/p C 4 -T1 Laminectomy and fusion. Pt with pre -existing hx of multiple falls, myelopathy , B Hand and feet numbness. Static Sitting: Fair -  Dynamic Sitting: Poor +           Static Standing: Poor +  Dynamic Standing: Poor    ACTIVITY TOLERANCE                         O2 WALK                  AM assistance level:CGA / minimum assistance with walker - rolling      Goal #2  Current Status  in progress    Goal #3 Patient is able to ambulate 100-150 feet with assist device: walker - rolling at assistance level:CGA/  minimum assistance   Goal #3   Curr

## 2021-03-12 NOTE — PLAN OF CARE
Problem: Patient Centered Care  Goal: Patient preferences are identified and integrated in the patient's plan of care  Description: Interventions:  - What would you like us to know as we care for you?   - Provide timely, complete, and accurate informatio and interruptions  - Encourage family to assist in orientation and promotion of home routines  Outcome: Progressing     Problem: PAIN - ADULT  Goal: Verbalizes/displays adequate comfort level or patient's stated pain goal  Description: INTERVENTIONS:  - En patient/family/discharge partner in discharge planning  - Arrange for needed discharge resources and transportation as appropriate  - Identify discharge learning needs (meds, wound care, etc)  - Arrange for interpreters to assist at discharge as needed  -

## 2021-03-12 NOTE — OCCUPATIONAL THERAPY NOTE
OCCUPATIONAL THERAPY TREATMENT NOTE - INPATIENT    Room Number: 435/435-A               Problem List  Active Problems:    Spinal stenosis    Myelopathy ([facility] [address]    Cervical myelopathy (HCC)      OCCUPATIONAL THERAPY ASSESSMENT     Pt seen up in sherry katz Position: Sitting    O2 SATURATIONS                ACTIVITIES OF DAILY LIVING ASSESSMENT  AM-PAC ‘6-Clicks’ Inpatient Daily Activity Short Form  How much help from another person does the patient currently need…  -   Putting on and taking off regular lower Mod I   Comment: min asisst with set up and pt seated.  Pt presenting with weak grasp CECILIO , R>L    Patient will independently recall spine precautions  Comment: pt able to recall with cues             Goals  on: 3/25/21  Frequency:5x/wk

## 2021-03-12 NOTE — PROGRESS NOTES
Spine Service Progress Note    24hrs/Events: up to chair yesterday x 30 min. Subjective: Reports difficulty with sleep and activity due to pain. Refuses opiates. Decreased appetite. Collar on and adjusted. Pain: 5-7/10.  Last took baclofen reports need for PT, patient has tolerated T#3 in the past and endorses okay for T#3 today. Order entered. Monitor tylenol dose to maintain less than 3000mg tylenol per day. -Discussed nutritional concerns post operatively, with calorie needs.  Patient will work

## 2021-03-12 NOTE — PLAN OF CARE
Jon Hernández is from home alone. Pod 1 w/Dr. Moe-cms baseline to pre-op status. Oob with 1-2 assists rolling walker & hard aspen collar.  Has voided s/p owens removal. Sx chaka c/d/I-no s/s infection-hemovac patent, min appetite-reports nausea-see emar, a/hs gluc interventions  Outcome: Progressing     Problem: Delirium  Goal: Minimize duration of delirium  Description: Interventions:  - Encourage use of hearing aids, eye glasses  - Promote highest level of mobility daily  - Provide frequent reorientation  - Promot environment to reduce risk of injury  - Provide assistive devices as appropriate  - Consider OT/PT consult to assist with strengthening/mobility  - Encourage toileting schedule  Outcome: Progressing     Problem: DISCHARGE PLANNING  Goal: Discharge to home

## 2021-03-12 NOTE — PROGRESS NOTES
[facility] Hospitalist Team  Progress Note    Alma Henao Patient Status:  Inpatient    1950 MRN [id number]   Location [facility] 4W/SW/SE Attending Nikky Osman MD   Hosp Day # 2 PCP Ella Palmer     CC: Follow Up  PCP: Ella Palmer dizziness, VS stable. Having pain in neck but wont take more than tylenol but now agreeing to T#3.  Needs staff to help hold  Her to sit in bed, readdressed rehab with pt who is willing to consider      OBJECTIVE:   Blood pressure 144/72, pulse 92, temperat breakfast  glucose (DEX4) oral liquid 15 g, 15 g, Oral, Q15 Min PRN   Or  Glucose-Vitamin C (DEX-4) chewable tab 4 tablet, 4 tablet, Oral, Q15 Min PRN   Or  dextrose 50 % injection 50 mL, 50 mL, Intravenous, Q15 Min PRN   Or  glucose (DEX4) oral liquid 30 Dictated by (CST): Tati Camacho MD on 3/10/2021 at 1:03 PM     Finalized by (CST): Tati Camacho MD on 3/10/2021 at 1:04 PM              SEE ATTENDING NOTE BELOW:   Patient seen and examined independently.   Discussed with APN and agree with not

## 2021-03-12 NOTE — CM/SW NOTE
BPCI-Advanced Medicare Program Note:  Plan of care reviewed for care coordination and discharge planning. Noted pt falls under  BPCI/Medicare program, with  for spinal fusion. NSOC recommendations for DIMA pending pt progress.  Case Management team i

## 2021-03-13 LAB
ANION GAP SERPL CALC-SCNC: 6 MMOL/L (ref 0–18)
BUN BLD-MCNC: 17 MG/DL (ref 7–18)
BUN/CREAT SERPL: 18.3 (ref 10–20)
CALCIUM BLD-MCNC: 8.9 MG/DL (ref 8.5–10.1)
CHLORIDE SERPL-SCNC: 103 MMOL/L (ref 98–112)
CO2 SERPL-SCNC: 29 MMOL/L (ref 21–32)
CREAT BLD-MCNC: 0.93 MG/DL
DEPRECATED RDW RBC AUTO: 39.1 FL (ref 35.1–46.3)
ERYTHROCYTE [DISTWIDTH] IN BLOOD BY AUTOMATED COUNT: 11.2 % (ref 11–15)
GLUCOSE BLD-MCNC: 146 MG/DL (ref 70–99)
GLUCOSE BLDC GLUCOMTR-MCNC: 106 MG/DL (ref 70–99)
GLUCOSE BLDC GLUCOMTR-MCNC: 116 MG/DL (ref 70–99)
GLUCOSE BLDC GLUCOMTR-MCNC: 126 MG/DL (ref 70–99)
GLUCOSE BLDC GLUCOMTR-MCNC: 165 MG/DL (ref 70–99)
HCT VFR BLD AUTO: 30.2 %
HGB BLD-MCNC: 10 G/DL
MCH RBC QN AUTO: 31.7 PG (ref 26–34)
MCHC RBC AUTO-ENTMCNC: 33.1 G/DL (ref 31–37)
MCV RBC AUTO: 95.9 FL
OSMOLALITY SERPL CALC.SUM OF ELEC: 290 MOSM/KG (ref 275–295)
PLATELET # BLD AUTO: 292 10(3)UL (ref 150–450)
POTASSIUM SERPL-SCNC: 4.4 MMOL/L (ref 3.5–5.1)
RBC # BLD AUTO: 3.15 X10(6)UL
SODIUM SERPL-SCNC: 138 MMOL/L (ref 136–145)
WBC # BLD AUTO: 11.5 X10(3) UL (ref 4–11)

## 2021-03-13 PROCEDURE — 80048 BASIC METABOLIC PNL TOTAL CA: CPT | Performed by: NURSE PRACTITIONER

## 2021-03-13 PROCEDURE — 82962 GLUCOSE BLOOD TEST: CPT

## 2021-03-13 PROCEDURE — 97530 THERAPEUTIC ACTIVITIES: CPT

## 2021-03-13 PROCEDURE — 85027 COMPLETE CBC AUTOMATED: CPT | Performed by: NURSE PRACTITIONER

## 2021-03-13 PROCEDURE — 97116 GAIT TRAINING THERAPY: CPT

## 2021-03-13 PROCEDURE — 97110 THERAPEUTIC EXERCISES: CPT

## 2021-03-13 RX ORDER — LATANOPROST 50 UG/ML
1 SOLUTION/ DROPS OPHTHALMIC DAILY
Status: DISCONTINUED | OUTPATIENT
Start: 2021-03-14 | End: 2021-03-24

## 2021-03-13 RX ORDER — ACETAMINOPHEN AND CODEINE PHOSPHATE 300; 30 MG/1; MG/1
1 TABLET ORAL EVERY 6 HOURS PRN
Qty: 30 TABLET | Refills: 0 | Status: SHIPPED | OUTPATIENT
Start: 2021-03-13

## 2021-03-13 RX ORDER — BACLOFEN 10 MG/1
10 TABLET ORAL 3 TIMES DAILY
Qty: 30 TABLET | Refills: 0 | Status: SHIPPED | OUTPATIENT
Start: 2021-03-13

## 2021-03-13 NOTE — PROGRESS NOTES
[facility] Hospitalist Team  Progress Note    Adeel Alvarenga Patient Status:  Inpatient    1950 MRN [id number]   Location [facility] 4W/SW/SE Attending Adilene Sheridan MD   Hosp Day # 3 PCP Agusto Dorman     CC: Follow Up  PCP: Agusto Dorman temperature source Oral, resp. rate 20, height 5' 7\" (1.702 m), weight 158 lb (71.7 kg), SpO2 97 %.     GENERAL: no apparent distress  NEURO: A/A Ox3  HEENT; cervical collar with drain   RESP: non labored, CTA  CARDIO: Regular, no murmur  ABD: soft, NT, ND injection 50 mL, 50 mL, Intravenous, Q15 Min PRN   Or  glucose (DEX4) oral liquid 30 g, 30 g, Oral, Q15 Min PRN   Or  Glucose-Vitamin C (DEX-4) chewable tab 8 tablet, 8 tablet, Oral, Q15 Min PRN  Insulin Aspart Pen (NOVOLOG) 100 UNIT/ML flexpen 1-7 Units,

## 2021-03-13 NOTE — PROGRESS NOTES
S:   Denies difficulty breathing or swallowing  Bilateral hand numbness. She feels weak with both hands. She has neck pain to bilateral upper arms. No headaches. She is nervous about go to [facility]. She has her collar on and in place.      Inspection: Awake

## 2021-03-13 NOTE — PHYSICAL THERAPY NOTE
PHYSICAL THERAPY TREATMENT NOTE - INPATIENT     Room Number: 435/435-A       Presenting Problem: Myelopathy , spinal stenosis . Pt is s/p C 4 -T1 Laminectomy and fusion. Pt with pre -existing hx of multiple falls, myelopathy , B Hand and feet numbness. Static Sitting: Fair +  Dynamic Sitting: Fair +           Static Standing: Fair -  Dynamic Standing: Poor    ACTIVITY TOLERANCE  Pulse: 98  Heart Rate Source: Monitor  Resp: 2 Status Mod A   Goal #2 Patient is able to demonstrate transfers EOB to/from Chair/Wheelchair at assistance level:CGA / minimum assistance with walker - rolling      Goal #2  Current Status Mod/Max A   Goal #3 Patient is able to ambulate 100-150 feet with a

## 2021-03-13 NOTE — PLAN OF CARE
Problem: Patient Centered Care  Goal: Patient preferences are identified and integrated in the patient's plan of care  Description: Interventions:  - What would you like us to know as we care for you?   - Provide timely, complete, and accurate informatio Verbalizes/displays adequate comfort level or patient's stated pain goal  Description: INTERVENTIONS:  - Encourage pt to monitor pain and request assistance  - Assess pain using appropriate pain scale  - Administer analgesics based on type and severity of learning needs (meds, wound care, etc)  - Arrange for interpreters to assist at discharge as needed  - Consider post-discharge preferences of patient/family/discharge partner  - Complete POLST form as appropriate  - Assess patient's ability to be responsib

## 2021-03-14 LAB
BLOOD TYPE BARCODE: 5100
GLUCOSE BLDC GLUCOMTR-MCNC: 142 MG/DL (ref 70–99)
GLUCOSE BLDC GLUCOMTR-MCNC: 159 MG/DL (ref 70–99)
GLUCOSE BLDC GLUCOMTR-MCNC: 166 MG/DL (ref 70–99)
GLUCOSE BLDC GLUCOMTR-MCNC: 87 MG/DL (ref 70–99)

## 2021-03-14 PROCEDURE — 82962 GLUCOSE BLOOD TEST: CPT

## 2021-03-14 PROCEDURE — 97116 GAIT TRAINING THERAPY: CPT

## 2021-03-14 PROCEDURE — 97112 NEUROMUSCULAR REEDUCATION: CPT

## 2021-03-14 NOTE — PLAN OF CARE
Pt is ao x 4, pt wearing aspen collar at all times, pt is sleeping on room air, pt retaining urine as of now may have to straight cath pt, pt is up with one stand and pivot to rolling commode, will continue to monitor pt for safety    Problem: Diabetes/Glu ADULT  Goal: Absence of fever/infection during anticipated neutropenic period  Description: INTERVENTIONS  - Monitor WBC  - Administer growth factors as ordered  - Implement neutropenic guidelines  Outcome: Progressing     Problem: [facility]

## 2021-03-14 NOTE — PROGRESS NOTES
[facility] Hospitalist Team  Progress Note    South [place] Martinez Patient Status:  Inpatient    1950 MRN [id number]   Location [facility] 4W/SW/SE Attending Yang Painting MD   Hosp Day # 4 PCP Krunal Mckeon     CC: Follow Up  PCP: Krunal Mckeon (1.702 m), weight 158 lb (71.7 kg), SpO2 99 %.     GENERAL: no apparent distress  NEURO: A/A Ox3  HEENT; cervical collar with drain   RESP: non labored, CTA  CARDIO: Regular, no murmur  ABD: soft, NT, ND, BS+  EXTREMITIES: no edema    DIAGNOSTIC DATA:   Lab liquid 30 g, 30 g, Oral, Q15 Min PRN   Or  Glucose-Vitamin C (DEX-4) chewable tab 8 tablet, 8 tablet, Oral, Q15 Min PRN  Insulin Aspart Pen (NOVOLOG) 100 UNIT/ML flexpen 1-7 Units, 1-7 Units, Subcutaneous, TID CC  Senna (SENOKOT) tab 17.2 mg, 17.2 mg, Oral

## 2021-03-14 NOTE — PROGRESS NOTES
S:   Denies difficulty breathing or swallowing  Mild left hand numbness. Her right arm pain is improved. Her main complaint is still numbness and weakness in her left arm. Pain isolated to the neck, and the left shoulder.   She is much happier today, an

## 2021-03-14 NOTE — PHYSICAL THERAPY NOTE
PHYSICAL THERAPY TREATMENT NOTE - INPATIENT     Room Number: 435/435-A       Presenting Problem: Myelopathy , spinal stenosis . Pt is s/p C 4 -T1 Laminectomy and fusion. Pt with pre -existing hx of multiple falls, myelopathy , B Hand and feet numbness. blankets as well. Alarm set and RN aware. Encouraged pt to be up more with staff as able.     DISCHARGE RECOMMENDATIONS  PT Discharge Recommendations: Acute rehabilitation     PLAN  PT Treatment Plan: Endurance;Gait training;Balance training;Transfer traini 76'  Assistive Device: Rolling walker  Pattern: Ataxic;R Foot drag;Shuffle  Stoop/Curb Assistance: Not tested  Comment : Patient with poor awareness of BLE when walking, kicked walker several times. Cues for upright posture.  pt with difficulty turning RW o Goal #5   Current Status  in progress - pt needs ongoing training for neutral spine, log roll, spine sparing   Goal #6 Family training pending DC plan , if able to progress to home.      Goal #6  Current Status  new plan to DC to [facility], will address if jose

## 2021-03-15 LAB
BILIRUB UR QL: NEGATIVE
CLARITY UR: CLEAR
COLOR UR: YELLOW
GLUCOSE BLDC GLUCOMTR-MCNC: 185 MG/DL (ref 70–99)
GLUCOSE BLDC GLUCOMTR-MCNC: 187 MG/DL (ref 70–99)
GLUCOSE BLDC GLUCOMTR-MCNC: 198 MG/DL (ref 70–99)
GLUCOSE BLDC GLUCOMTR-MCNC: 210 MG/DL (ref 70–99)
GLUCOSE UR-MCNC: NEGATIVE MG/DL
HGB UR QL STRIP.AUTO: NEGATIVE
LEUKOCYTE ESTERASE UR QL STRIP.AUTO: NEGATIVE
NITRITE UR QL STRIP.AUTO: NEGATIVE
PH UR: 5 [PH] (ref 5–8)
PROT UR-MCNC: NEGATIVE MG/DL
SP GR UR STRIP: 1.02 (ref 1–1.03)
UROBILINOGEN UR STRIP-ACNC: <2

## 2021-03-15 PROCEDURE — 97530 THERAPEUTIC ACTIVITIES: CPT

## 2021-03-15 PROCEDURE — 81003 URINALYSIS AUTO W/O SCOPE: CPT | Performed by: HOSPITALIST

## 2021-03-15 PROCEDURE — 97116 GAIT TRAINING THERAPY: CPT

## 2021-03-15 PROCEDURE — 82962 GLUCOSE BLOOD TEST: CPT

## 2021-03-15 NOTE — PROGRESS NOTES
[facility] Hospitalist Team  Progress Note    Leif Hoskins Patient Status:  Inpatient    1950 MRN [id number]   Location [facility] 4W/SW/SE Attending Abraham Ruelas MD   Hosp Day # 5 PCP Leslie Lackey     CC: Follow Up  PCP: Leslie Lackey T#3 helping with pain. Plans for [facility] for rehab if insurance approves. OBJECTIVE:   Blood pressure 129/67, pulse 91, temperature 98.7 °F (37.1 °C), temperature source Oral, resp.  rate 16, height 5' 7\" (1.702 m), weight 158 lb (71.7 kg), SpO2 9 (DEX-4) chewable tab 4 tablet, 4 tablet, Oral, Q15 Min PRN   Or  dextrose 50 % injection 50 mL, 50 mL, Intravenous, Q15 Min PRN   Or  glucose (DEX4) oral liquid 30 g, 30 g, Oral, Q15 Min PRN   Or  Glucose-Vitamin C (DEX-4) chewable tab 8 tablet, 8 tablet, and Plan  S/P C4-T1 Laminectomy and Fusion  -prn pain meds-trial T#3  -PT/OT/SW- rehab   -as per surgery      Acute on Chronic Anemia  2/2 Blood Loss, B12 def and ?  Iron Def  -pre op hgb 10.6,blood loss in OR 800cc--> hgb in OR 7.2 S/P 1 unit PRBC's, post

## 2021-03-15 NOTE — SPIRITUAL CARE NOTE
Referral:  visited per consult. Assessment: Pt sitting in chair napping. She says she is feeling better and has been treated very well during her week long admission. Pt has a very large family. Father had 12 children.   She is supported by he

## 2021-03-15 NOTE — PHYSICAL THERAPY NOTE
PHYSICAL THERAPY TREATMENT NOTE - INPATIENT     Room Number: 435/435-A       Presenting Problem: Myelopathy , spinal stenosis . Pt is s/p C 4 -T1 Laminectomy and fusion. Pt with pre -existing hx of multiple falls, myelopathy , B Hand and feet numbness. care/supervision     PLAN  PT Treatment Plan: Endurance;Gait training;Balance training;Transfer training    SUBJECTIVE  \"I need cleaned up before I go walking around\"    OBJECTIVE  Precautions: Cervical brace;Spine    WEIGHT BEARING RESTRICTION  Weight B session/findings;Call light within reach    CURRENT GOALS   Goals to be met by:  2 weeks (3/25/2021)   Patient Goal Patient's self-stated goal is: home    Goal #1 Patient is able to demonstrate supine - sit EOB @ level: minimum assistance      Goal #1   Cu

## 2021-03-15 NOTE — PLAN OF CARE
Dania Knee is post op day #5- s/p cervical/thoracic fusion. Alert. States neuropathy pre existing. [place] collar all times. urinary retention- was S.C by noc shift 430 am- unable to void- inserted owens- U/A C&S sent- return of 350 immediately.  Pain managed w t neutropenic guidelines  Outcome: Progressing- UA/C7S sent to check urine for UTI     Problem: SAFETY ADULT - FALL  Goal: Free from fall injury  Description: INTERVENTIONS:  - Assess pt frequently for physical needs  - Identify cognitive and physical defici

## 2021-03-15 NOTE — DISCHARGE SUMMARY
[facility] Hospitalist Discharge Summary   Patient ID:  Blanca Michaud  [id number]  66 year old  12/23/1950    Admit date: 3/10/2021  Discharge date: 3/17/2021    Primary Care Physician: Bernie Washington   Attending Physician: Jose Braun MD   Consults:   Carla Gamez nightly  -discharge  tresiba at 10 unit nightly, 5 aspart TID, plus trulicity  -accuchecks  -ADA diet      GERD  -omeprazole        EXAM:   GENERAL: no apparent distress  NEURO: A/A Ox3  HEENT:  Collar in place with dressing   RESP: non labored, CTA  CARDI PLUS LOW IRON OR           Where to Get Your Medications      These medications were sent to [facility], [facility]  [facility], [phone], [address] [place] [address]

## 2021-03-15 NOTE — CM/SW NOTE
3/15/2021  1020: CM met with pt at bedside to discuss rehab options and provide pt with pt choice list.  Per pt she and her sister are requesting rehab at [facility] and/or [facility] [place]. CM sent referrals via [employer].     1215: CM followed up with [facility]

## 2021-03-15 NOTE — PLAN OF CARE
Pt is ao x 4, pt wearing aspen collar at all times, pt is sleeping on room air, pt is a check void, pt is up with one stand and pivot to rolling commode, will continue to monitor pt for safety    Problem: Diabetes/Glucose Control  Goal: Glucose maintained fever/infection during anticipated neutropenic period  Description: INTERVENTIONS  - Monitor WBC  - Administer growth factors as ordered  - Implement neutropenic guidelines  Outcome: Progressing     Problem: SAFETY ADULT - FALL  Goal: Free from fall injury

## 2021-03-16 LAB
GLUCOSE BLDC GLUCOMTR-MCNC: 162 MG/DL (ref 70–99)
GLUCOSE BLDC GLUCOMTR-MCNC: 193 MG/DL (ref 70–99)
GLUCOSE BLDC GLUCOMTR-MCNC: 204 MG/DL (ref 70–99)
GLUCOSE BLDC GLUCOMTR-MCNC: 253 MG/DL (ref 70–99)

## 2021-03-16 PROCEDURE — 82962 GLUCOSE BLOOD TEST: CPT

## 2021-03-16 PROCEDURE — 97116 GAIT TRAINING THERAPY: CPT

## 2021-03-16 PROCEDURE — 97530 THERAPEUTIC ACTIVITIES: CPT

## 2021-03-16 RX ORDER — MELATONIN
325
Refills: 0 | Status: SHIPPED | COMMUNITY
Start: 2021-03-17 | End: 2021-03-19

## 2021-03-16 RX ORDER — MAGNESIUM CARB/ALUMINUM HYDROX 105-160MG
296 TABLET,CHEWABLE ORAL ONCE
Status: DISCONTINUED | OUTPATIENT
Start: 2021-03-16 | End: 2021-03-17

## 2021-03-16 RX ORDER — PSEUDOEPHEDRINE HCL 30 MG
100 TABLET ORAL 2 TIMES DAILY
Refills: 0 | Status: SHIPPED | COMMUNITY
Start: 2021-03-16 | End: 2021-03-19

## 2021-03-16 RX ORDER — POLYETHYLENE GLYCOL 3350 17 G/17G
17 POWDER, FOR SOLUTION ORAL DAILY PRN
Refills: 0 | Status: SHIPPED | COMMUNITY
Start: 2021-03-16 | End: 2021-03-19

## 2021-03-16 NOTE — PHYSICAL THERAPY NOTE
PHYSICAL THERAPY TREATMENT NOTE - INPATIENT     Room Number: 435/435-A       Presenting Problem: Myelopathy , spinal stenosis . Pt is s/p C 4 -T1 Laminectomy and fusion. Pt with pre -existing hx of multiple falls, myelopathy , B Hand and feet numbness. brace;Spine (Aspen collar: wear at all times)    WEIGHT BEARING RESTRICTION  Weight Bearing Restriction: None                PAIN ASSESSMENT   Rating:  (/)  Location:  (/)  Management Techniques:  (/)    BALANCE Chair/Wheelchair at assistance level:CGA / minimum assistance with walker - rolling      Goal #2  Current Status MIN A   Goal #3 Patient is able to ambulate 100-150 feet with assist device: walker - rolling at assistance level:CGA/  minimum assistance

## 2021-03-16 NOTE — PLAN OF CARE
Alert and oriented x 3, RA, aspen collar on and aligned at all times. ACHS per order. Tylenol #3 for pain. Gauze and Tegaderm c/d/I. Patient reports numbness and tingling to left hand fingertips and states that it was there prior to surgery.  Radial pulses Goal: .    Interventions:  - .  - See additional Care Plan goals for specific interventions  Outcome: Progressing  Goal: Patient/Family Short Term Goal  Description: Patient's Short Term Goal: .     Interventions:   - .  - See additional Care Plan goals for DISCHARGE PLANNING  Goal: Discharge to home or other facility with appropriate resources  Description: INTERVENTIONS:  - Identify barriers to discharge w/pt and caregiver  - Include patient/family/discharge partner in discharge planning  - Arrange for need

## 2021-03-16 NOTE — PLAN OF CARE
Problem: Patient Centered Care  Goal: Patient preferences are identified and integrated in the patient's plan of care  Description: Interventions:  - What would you like us to know as we care for you?   - Provide timely, complete, and accurate informatio Administer growth factors as ordered  - Implement neutropenic guidelines  Outcome: Progressing     Problem: SAFETY ADULT - FALL  Goal: Free from fall injury  Description: INTERVENTIONS:  - Assess pt frequently for physical needs  - Identify cognitive and p position. Plan to go to [facility] when insurance approved.

## 2021-03-16 NOTE — PROGRESS NOTES
Spine Service Progress Note    24hrs/Events: Per nursing patient had a BM overnight. Subjective: Sitting in chair. Reports continued left arm weakness. Pain well controlled on current regiment. Reports history of opiate constipation concern.  Patient plan per medicine. Bowel: Continue colace, continue milk of mag as ordered, continue senokot as ordered, add mag citrate dose today- hold if diarrhea. Consider fleet enema if no BM with current regiment.    Discharge plan: pending cleared by medicine and

## 2021-03-16 NOTE — PROGRESS NOTES
[facility] Hospitalist Team  Progress Note    Alma Henao Patient Status:  Inpatient    1950 MRN [id number]   Location [facility] 4W/SW/SE Attending Nikky Osman MD   Hosp Day # 6 PCP Ella Palmer     CC: Follow Up  PCP: Ella Palmer number: [phone]  3/16/2021    SUBJECTIVE:   Rojas placed yesterday. Wants to have BM, oral meds not helping. Worried about it being cold and transporting to rehab center.        OBJECTIVE:   Blood pressure 113/60, pulse 83, temperature 98.3 °F (36.8 °C Oral, Daily with breakfast  glucose (DEX4) oral liquid 15 g, 15 g, Oral, Q15 Min PRN   Or  Glucose-Vitamin C (DEX-4) chewable tab 4 tablet, 4 tablet, Oral, Q15 Min PRN   Or  dextrose 50 % injection 50 mL, 50 mL, Intravenous, Q15 Min PRN   Or  glucose (DEX4 rubs, gallops  Abd: Abdomen soft, nontender, nondistended, no organomegaly, bowel sounds present  MSK:  no clubbing, no cyanosis.   No Lower extremity edema  Skin: no rashes or lesions, well perfused  Psych: mood stable, cooperative  Neuro: no focal deficit

## 2021-03-17 LAB
ANION GAP SERPL CALC-SCNC: 3 MMOL/L (ref 0–18)
BASOPHILS # BLD AUTO: 0.03 X10(3) UL (ref 0–0.2)
BASOPHILS NFR BLD AUTO: 0.4 %
BUN BLD-MCNC: 20 MG/DL (ref 7–18)
BUN/CREAT SERPL: 25 (ref 10–20)
CALCIUM BLD-MCNC: 8.8 MG/DL (ref 8.5–10.1)
CHLORIDE SERPL-SCNC: 100 MMOL/L (ref 98–112)
CO2 SERPL-SCNC: 31 MMOL/L (ref 21–32)
CREAT BLD-MCNC: 0.8 MG/DL
DEPRECATED RDW RBC AUTO: 37.7 FL (ref 35.1–46.3)
EOSINOPHIL # BLD AUTO: 0.18 X10(3) UL (ref 0–0.7)
EOSINOPHIL NFR BLD AUTO: 2.4 %
ERYTHROCYTE [DISTWIDTH] IN BLOOD BY AUTOMATED COUNT: 10.7 % (ref 11–15)
GLUCOSE BLD-MCNC: 176 MG/DL (ref 70–99)
GLUCOSE BLDC GLUCOMTR-MCNC: 147 MG/DL (ref 70–99)
GLUCOSE BLDC GLUCOMTR-MCNC: 150 MG/DL (ref 70–99)
GLUCOSE BLDC GLUCOMTR-MCNC: 210 MG/DL (ref 70–99)
GLUCOSE BLDC GLUCOMTR-MCNC: 252 MG/DL (ref 70–99)
HAV IGM SER QL: 2.5 MG/DL (ref 1.6–2.6)
HCT VFR BLD AUTO: 26.5 %
HGB BLD-MCNC: 8.7 G/DL
IMM GRANULOCYTES # BLD AUTO: 0.02 X10(3) UL (ref 0–1)
IMM GRANULOCYTES NFR BLD: 0.3 %
LYMPHOCYTES # BLD AUTO: 1.08 X10(3) UL (ref 1–4)
LYMPHOCYTES NFR BLD AUTO: 14.1 %
MCH RBC QN AUTO: 31.3 PG (ref 26–34)
MCHC RBC AUTO-ENTMCNC: 32.8 G/DL (ref 31–37)
MCV RBC AUTO: 95.3 FL
MONOCYTES # BLD AUTO: 0.58 X10(3) UL (ref 0.1–1)
MONOCYTES NFR BLD AUTO: 7.6 %
NEUTROPHILS # BLD AUTO: 5.76 X10 (3) UL (ref 1.5–7.7)
NEUTROPHILS # BLD AUTO: 5.76 X10(3) UL (ref 1.5–7.7)
NEUTROPHILS NFR BLD AUTO: 75.2 %
OSMOLALITY SERPL CALC.SUM OF ELEC: 285 MOSM/KG (ref 275–295)
PLATELET # BLD AUTO: 356 10(3)UL (ref 150–450)
POTASSIUM SERPL-SCNC: 4.8 MMOL/L (ref 3.5–5.1)
RBC # BLD AUTO: 2.78 X10(6)UL
SODIUM SERPL-SCNC: 134 MMOL/L (ref 136–145)
WBC # BLD AUTO: 7.7 X10(3) UL (ref 4–11)

## 2021-03-17 PROCEDURE — 97116 GAIT TRAINING THERAPY: CPT

## 2021-03-17 PROCEDURE — 97530 THERAPEUTIC ACTIVITIES: CPT

## 2021-03-17 PROCEDURE — 82962 GLUCOSE BLOOD TEST: CPT

## 2021-03-17 PROCEDURE — 80048 BASIC METABOLIC PNL TOTAL CA: CPT | Performed by: INTERNAL MEDICINE

## 2021-03-17 PROCEDURE — 85025 COMPLETE CBC W/AUTO DIFF WBC: CPT | Performed by: INTERNAL MEDICINE

## 2021-03-17 PROCEDURE — 83735 ASSAY OF MAGNESIUM: CPT | Performed by: INTERNAL MEDICINE

## 2021-03-17 NOTE — PHYSICAL THERAPY NOTE
PHYSICAL THERAPY TREATMENT NOTE - INPATIENT     Room Number: 435/435-A       Presenting Problem: Myelopathy , spinal stenosis . Pt is s/p C 4 -T1 Laminectomy and fusion. Pt with pre -existing hx of multiple falls, myelopathy , B Hand and feet numbness. MOD A with 2nd person CGA for safety, demonstrated wide BRIAN, B toe out posture, shuffled steps, increased speed with impaired walker use, assist for walker management and verbal cues throughout for safety.  Pt assisted to seated in chair post-activity, recl Static Sitting: Fair -  Dynamic Sitting: Fair -           Static Standing: Poor +  Dynamic Standing: Poor    ACTIVITY TOLERANCE  Post-activity, seated:  SPO2 96% on r mobility via log roll at MOD A   Goal #2 Patient is able to demonstrate transfers EOB to/from Chair/Wheelchair at assistance level:CGA / minimum assistance with walker - rolling      Goal #2  Current Status Pt performs tx with rolling walker at [facility]

## 2021-03-17 NOTE — OCCUPATIONAL THERAPY NOTE
OCCUPATIONAL THERAPY TREATMENT NOTE - INPATIENT    Room Number: 435/435-A    Presenting Problem: : Myelopathy , spinal stenosis . Pt is s/p C 4 -T1 Laminectomy and fusion. Pt with pre -existing hx of multiple falls, myelopathy , B Hand and feet numbness. eval/education    SUBJECTIVE  'I can't do this I told you I am hurting'    OBJECTIVE  Precautions: Cervical brace (Aspen collar - on at all times)    WEIGHT BEARING RESTRICTION  Weight Bearing Restriction: None                PAIN ASSESSMENT  Ratin  Lo

## 2021-03-17 NOTE — PLAN OF CARE
Problem: Patient Centered Care  Goal: Patient preferences are identified and integrated in the patient's plan of care  Description: Interventions:  - What would you like us to know as we care for you?   - Provide timely, complete, and accurate informatio management  - Manage/alleviate anxiety  - Utilize distraction and/or relaxation techniques  - Monitor for opioid side effects  - Notify MD/LIP if interventions unsuccessful or patient reports new pain  - Anticipate increased pain with activity and pre-medi physician/LIP order or complex needs related to functional status, cognitive ability or social support system  Outcome: Stefani Schulz resting comfortably in bed, repositioned as needed. Aspen collar on at all times.  Pt was able to stand and pivot

## 2021-03-17 NOTE — PROGRESS NOTES
[facility] Hospitalist Team  Progress Note    Benna Failing Patient Status:  Inpatient    1950 MRN [id number]   Location [facility] 4W/SW/SE Attending Kristina Lyn MD   [facility] Day # 7 PCP Bharathi Reason     CC: Follow Up  PCP: Bharathi Murguia [phone]  3/17/2021    SUBJECTIVE:   Awaiting insurance auth for [facility] [employer]. No issues       OBJECTIVE:   Blood pressure 115/71, pulse 78, temperature 98.2 °F (36.8 °C), temperature source Oral, resp.  rate 22, height 5' 7\" (1.702 m), weight 158 lb ( 15 g, Oral, Q15 Min PRN   Or  Glucose-Vitamin C (DEX-4) chewable tab 4 tablet, 4 tablet, Oral, Q15 Min PRN   Or  dextrose 50 % injection 50 mL, 50 mL, Intravenous, Q15 Min PRN   Or  glucose (DEX4) oral liquid 30 g, 30 g, Oral, Q15 Min PRN   Or  Glucose-Vit sounds present  MSK:  no clubbing, no cyanosis.   No Lower extremity edema  Skin: no rashes or lesions, well perfused  Psych: mood stable, cooperative  Neuro: no focal deficits     Assessment and Plan  S/P C4-T1 Laminectomy and Fusion  -prn pain meds-T#3  -

## 2021-03-17 NOTE — PROGRESS NOTES
Spine Service Progress Note    Subjective: Clarified with patient she states the previous BM was a large BM. She is stating that her owens is causing some pain, she has removed the owens stabilization sticker from her leg. Pain: 6/10.  Reports pain i stabilization sticker removed as this will cause tugging on the owens. We discussed need for the stabilization sticker, she will work with nursing regarding owens stabilization needs.  Continue owens care per medicine.   -Continue current discharge plan, keiko

## 2021-03-17 NOTE — PROGRESS NOTES
[facility] Hospitalist Team  Progress Note    Blanca Michaud Patient Status:  Inpatient    1950 MRN [id number]   Location [facility] 4W/SW/SE Attending Jose Braun MD   [facility] Day # 7 PCP Bernie Washington     CC: Follow Up  PCP: Bernie Washington Tory Myers for rehab if insurance approves. OBJECTIVE:   Blood pressure 115/71, pulse 78, temperature 98.2 °F (36.8 °C), temperature source Oral, resp. rate 22, height 5' 7\" (1.702 m), weight 158 lb (71.7 kg), SpO2 98 %.     GENERAL: no apparent dist chewable tab 4 tablet, 4 tablet, Oral, Q15 Min PRN   Or  dextrose 50 % injection 50 mL, 50 mL, Intravenous, Q15 Min PRN   Or  glucose (DEX4) oral liquid 30 g, 30 g, Oral, Q15 Min PRN   Or  Glucose-Vitamin C (DEX-4) chewable tab 8 tablet, 8 tablet, Oral, Q1 Plan  S/P C4-T1 Laminectomy and Fusion  -prn pain meds-trial T#3  -PT/OT/SW- rehab   -as per surgery      Acute on Chronic Anemia  2/2 Blood Loss, B12 def and ?  Iron Def  -pre op hgb 10.6,blood loss in OR 800cc--> hgb in OR 7.2 S/P 1 unit PRBC's, post op h

## 2021-03-17 NOTE — PLAN OF CARE
Problem: Patient Centered Care  Goal: Patient preferences are identified and integrated in the patient's plan of care  Description: Interventions:  - Provide timely, complete, and accurate information to patient/family  - Incorporate patient and family k INTERVENTIONS  - Monitor WBC  - Administer growth factors as ordered  - Implement neutropenic guidelines  Outcome: Progressing     Problem: SAFETY ADULT - FALL  Goal: Free from fall injury  Description: INTERVENTIONS:  - Assess pt frequently for physical n remain in place upon discharge. 0900: Patient complaint of sticker irritation from owens catheter, states feels better once sticker removed. No further complaints related to the owens per patient. Draining well to gravity. Owens care completed.

## 2021-03-18 LAB
ANION GAP SERPL CALC-SCNC: 7 MMOL/L (ref 0–18)
BASOPHILS # BLD AUTO: 0.03 X10(3) UL (ref 0–0.2)
BASOPHILS NFR BLD AUTO: 0.3 %
BUN BLD-MCNC: 20 MG/DL (ref 7–18)
BUN/CREAT SERPL: 25 (ref 10–20)
CALCIUM BLD-MCNC: 8.8 MG/DL (ref 8.5–10.1)
CHLORIDE SERPL-SCNC: 98 MMOL/L (ref 98–112)
CO2 SERPL-SCNC: 31 MMOL/L (ref 21–32)
CREAT BLD-MCNC: 0.8 MG/DL
DEPRECATED RDW RBC AUTO: 38 FL (ref 35.1–46.3)
EOSINOPHIL # BLD AUTO: 0.21 X10(3) UL (ref 0–0.7)
EOSINOPHIL NFR BLD AUTO: 2.4 %
ERYTHROCYTE [DISTWIDTH] IN BLOOD BY AUTOMATED COUNT: 10.8 % (ref 11–15)
GLUCOSE BLD-MCNC: 170 MG/DL (ref 70–99)
GLUCOSE BLDC GLUCOMTR-MCNC: 164 MG/DL (ref 70–99)
GLUCOSE BLDC GLUCOMTR-MCNC: 167 MG/DL (ref 70–99)
GLUCOSE BLDC GLUCOMTR-MCNC: 191 MG/DL (ref 70–99)
GLUCOSE BLDC GLUCOMTR-MCNC: 232 MG/DL (ref 70–99)
HAV IGM SER QL: 2.5 MG/DL (ref 1.6–2.6)
HCT VFR BLD AUTO: 27.8 %
HGB BLD-MCNC: 9.3 G/DL
IMM GRANULOCYTES # BLD AUTO: 0.03 X10(3) UL (ref 0–1)
IMM GRANULOCYTES NFR BLD: 0.3 %
LYMPHOCYTES # BLD AUTO: 1.18 X10(3) UL (ref 1–4)
LYMPHOCYTES NFR BLD AUTO: 13.2 %
MCH RBC QN AUTO: 32.2 PG (ref 26–34)
MCHC RBC AUTO-ENTMCNC: 33.5 G/DL (ref 31–37)
MCV RBC AUTO: 96.2 FL
MONOCYTES # BLD AUTO: 0.51 X10(3) UL (ref 0.1–1)
MONOCYTES NFR BLD AUTO: 5.7 %
NEUTROPHILS # BLD AUTO: 6.96 X10 (3) UL (ref 1.5–7.7)
NEUTROPHILS # BLD AUTO: 6.96 X10(3) UL (ref 1.5–7.7)
NEUTROPHILS NFR BLD AUTO: 78.1 %
OSMOLALITY SERPL CALC.SUM OF ELEC: 289 MOSM/KG (ref 275–295)
PLATELET # BLD AUTO: 415 10(3)UL (ref 150–450)
POTASSIUM SERPL-SCNC: 4.6 MMOL/L (ref 3.5–5.1)
RBC # BLD AUTO: 2.89 X10(6)UL
SODIUM SERPL-SCNC: 136 MMOL/L (ref 136–145)
WBC # BLD AUTO: 8.9 X10(3) UL (ref 4–11)

## 2021-03-18 PROCEDURE — 85025 COMPLETE CBC W/AUTO DIFF WBC: CPT | Performed by: INTERNAL MEDICINE

## 2021-03-18 PROCEDURE — 97535 SELF CARE MNGMENT TRAINING: CPT

## 2021-03-18 PROCEDURE — 80048 BASIC METABOLIC PNL TOTAL CA: CPT | Performed by: INTERNAL MEDICINE

## 2021-03-18 PROCEDURE — 97530 THERAPEUTIC ACTIVITIES: CPT

## 2021-03-18 PROCEDURE — 83735 ASSAY OF MAGNESIUM: CPT | Performed by: INTERNAL MEDICINE

## 2021-03-18 PROCEDURE — 97116 GAIT TRAINING THERAPY: CPT

## 2021-03-18 PROCEDURE — 82962 GLUCOSE BLOOD TEST: CPT

## 2021-03-18 RX ORDER — TAMSULOSIN HYDROCHLORIDE 0.4 MG/1
0.4 CAPSULE ORAL DAILY
Status: DISCONTINUED | OUTPATIENT
Start: 2021-03-18 | End: 2021-03-24

## 2021-03-18 NOTE — PROGRESS NOTES
[facility] Hospitalist Team  Progress Note    Shy Guzman Patient Status:  Inpatient    1950 MRN [id number]   Location [facility] 4W/SW/SE Attending James Kiser MD   [facility] Day # 8 PCP Marcelyn Ahumada     CC: Follow Up  PCP: Marcelyn Ahumada 128/65, pulse 85, temperature 98.3 °F (36.8 °C), temperature source Oral, resp. rate 18, height 5' 7\" (1.702 m), weight 158 lb (71.7 kg), SpO2 95 %.     GENERAL: no apparent distress  NEURO: A/A Ox3  HEENT: hard collar with dressing   RESP: non labored, CT mL, 50 mL, Intravenous, Q15 Min PRN   Or  glucose (DEX4) oral liquid 30 g, 30 g, Oral, Q15 Min PRN   Or  Glucose-Vitamin C (DEX-4) chewable tab 8 tablet, 8 tablet, Oral, Q15 Min PRN  Insulin Aspart Pen (NOVOLOG) 100 UNIT/ML flexpen 1-7 Units, 1-7 Units, Rodriguez deficits     Assessment and Plan  S/P C4-T1 Laminectomy and Fusion  -prn pain meds-T#3  -PT/OT/SW- rehab   -as per ortho-spine     Acute on Chronic Anemia  2/2 Blood Loss, B12 def and ?  Iron Def  -pre op hgb 10.6,blood loss in OR 800cc--> hgb in OR 7.2 S/P

## 2021-03-18 NOTE — OCCUPATIONAL THERAPY NOTE
OCCUPATIONAL THERAPY TREATMENT NOTE - INPATIENT        Room Number: 435/435-A           Presenting Problem: : Myelopathy , spinal stenosis . Pt is s/p C 4 -T1 Laminectomy and fusion.    Pt with pre -existing hx of multiple falls, myelopathy , B Hand and fee chair     PLAN  OT Treatment Plan: Balance activities; ADL training;IADL training;UE strengthening/ROM; Cognitive reorientation;Patient/Family education;Patient/Family training;Functional transfer training; Endurance training;Equipment eval/education    SUBJE will complete toilet transfer with Mod I Min A    Patient will complete self care task at sink level with Mod I   Comment: ongoing     Patient will independently recall spine precautions  Comment: NT            Goals  on: 3/25/21  Frequency:5x/wk

## 2021-03-18 NOTE — PHYSICAL THERAPY NOTE
PHYSICAL THERAPY TREATMENT NOTE - INPATIENT     Room Number: 435/435-A       Presenting Problem: Myelopathy , spinal stenosis . Pt is s/p C 4 -T1 Laminectomy and fusion. Pt with pre -existing hx of multiple falls, myelopathy , B Hand and feet numbness. all times)    WEIGHT BEARING RESTRICTION  Weight Bearing Restriction: None                PAIN ASSESSMENT   Rating: Unable to rate  Location: posterior neck  Management Techniques:  Activity promotion;Breathing techniques;Relaxation;Repositioning    BALANCE Goal #2 Patient is able to demonstrate transfers EOB to/from Chair/Wheelchair at assistance level:CGA / minimum assistance with walker - rolling      Goal #2  Current Status MIN A RW   Goal #3 Patient is able to ambulate 100-150 feet with assist device:

## 2021-03-18 NOTE — DISCHARGE SUMMARY
[facility] Hospitalist Discharge Summary   Patient ID:  Milvia Merlos  [id number]  51 year old  12/23/1950    Admit date: 3/10/2021  Discharge date: 3/22/2021    Primary Care Physician: Elio Baeza   Attending Physician: Dixon Castro MD   Consults:   Magaly Meade tid, tresiba 10 units nightly  -discharge  tresiba at 10 unit nightly, 6 aspart TID, plus trulicity  -accuchecks  -ADA diet      GERD  -omeprazole      EXAM:   GENERAL: no apparent distress  NEURO: A/A Ox3  HEENT:  Collar in place with dressing   RESP: non Tabs  Commonly known as: ANTIVERT     omeprazole 20 MG Cpdr  Commonly known as: PRILOSEC     Trulicity 3.84 IF/0.8UV Sopn  Generic drug: Dulaglutide     VITAMIN B 12 OR     Vitamin D3 75 MCG (3000 UT) Tabs        STOP taking these medications    Chlorhexid above    Patient improved. Stable for discharge to SNF, cleared per PT and ortho spine.       GEN: NAD  RESP CTAB  CV RRR    Time of discharge >30 minutes

## 2021-03-18 NOTE — PLAN OF CARE
Problem: Patient Centered Care  Goal: Patient preferences are identified and integrated in the patient's plan of care  Description: Interventions:  - What would you like us to know as we care for you?   - Provide timely, complete, and accurate informatio distraction and/or relaxation techniques  - Monitor for opioid side effects  - Notify MD/LIP if interventions unsuccessful or patient reports new pain  - Anticipate increased pain with activity and pre-medicate as appropriate  Outcome: Progressing     Prob functional status, cognitive ability or social support system  Outcome: Nasima Queen was resting comfortably in bed, aspen collar on at all times. Pain managed with PRN Tylenol and Baclofen. Rojas cath in place, intact, and draining.  Accucheck A

## 2021-03-18 NOTE — PROGRESS NOTES
Spine Service Progress Note    24hrs/Events: Rojas discontinued today, awaiting urination     Subjective: Patient is in chair resting comfortably.      Pain: controlled currently   Denies F/C/N/V  Swallow: good  Speech: good  Appetite: good  BM: Reports n rehab.   Continue plan for urination per hospitalist.   Encouraged patient to review with family for secondary DIMA, as primary request is still pending insurance. -Questions encouraged and answered.          JONEL Claudio Dr. of Sp

## 2021-03-18 NOTE — PLAN OF CARE
Problem: Patient Centered Care  Goal: Patient preferences are identified and integrated in the patient's plan of care  Description: Interventions:  - Provide timely, complete, and accurate information to patient/family  - Incorporate patient and family k fever/infection during anticipated neutropenic period  Description: INTERVENTIONS  - Monitor WBC  - Administer growth factors as ordered  - Implement neutropenic guidelines  Outcome: Progressing     Problem: SAFETY ADULT - FALL  Goal: Free from fall injury SCDs & Teds in place. Surgical dressing CDI. Cervical collar in place at all times. 1550: NP Nadeen Ardon notified patient unable to void after attempts to void. Bladder scan at this time was 220 ml.  NP states give patient 2 more hours, bladder scan again at 25

## 2021-03-19 LAB
BILIRUB UR QL: NEGATIVE
COLOR UR: YELLOW
GLUCOSE BLDC GLUCOMTR-MCNC: 103 MG/DL (ref 70–99)
GLUCOSE BLDC GLUCOMTR-MCNC: 160 MG/DL (ref 70–99)
GLUCOSE BLDC GLUCOMTR-MCNC: 187 MG/DL (ref 70–99)
GLUCOSE BLDC GLUCOMTR-MCNC: 92 MG/DL (ref 70–99)
GLUCOSE UR-MCNC: NEGATIVE MG/DL
KETONES UR-MCNC: NEGATIVE MG/DL
NITRITE UR QL STRIP.AUTO: POSITIVE
PH UR: 7 [PH] (ref 5–8)
PROT UR-MCNC: 100 MG/DL
RBC #/AREA URNS AUTO: >10 /HPF
SP GR UR STRIP: 1.02 (ref 1–1.03)
UROBILINOGEN UR STRIP-ACNC: <2
WBC #/AREA URNS AUTO: >50 /HPF
WBC CLUMPS UR QL AUTO: PRESENT /HPF

## 2021-03-19 PROCEDURE — 82962 GLUCOSE BLOOD TEST: CPT

## 2021-03-19 PROCEDURE — 87077 CULTURE AEROBIC IDENTIFY: CPT | Performed by: NURSE PRACTITIONER

## 2021-03-19 PROCEDURE — 81001 URINALYSIS AUTO W/SCOPE: CPT | Performed by: NURSE PRACTITIONER

## 2021-03-19 PROCEDURE — 87086 URINE CULTURE/COLONY COUNT: CPT | Performed by: NURSE PRACTITIONER

## 2021-03-19 PROCEDURE — 87186 SC STD MICRODIL/AGAR DIL: CPT | Performed by: NURSE PRACTITIONER

## 2021-03-19 PROCEDURE — 97535 SELF CARE MNGMENT TRAINING: CPT

## 2021-03-19 RX ORDER — PHENAZOPYRIDINE HYDROCHLORIDE 100 MG/1
100 TABLET, FILM COATED ORAL 3 TIMES DAILY PRN
Status: COMPLETED | OUTPATIENT
Start: 2021-03-19 | End: 2021-03-22

## 2021-03-19 RX ORDER — INSULIN DEGLUDEC INJECTION 100 U/ML
10 INJECTION, SOLUTION SUBCUTANEOUS DAILY
Refills: 0 | Status: SHIPPED | COMMUNITY
Start: 2021-03-19

## 2021-03-19 RX ORDER — PHENAZOPYRIDINE HYDROCHLORIDE 100 MG/1
100 TABLET, FILM COATED ORAL 3 TIMES DAILY PRN
Qty: 21 TABLET | Refills: 0 | Status: SHIPPED | OUTPATIENT
Start: 2021-03-19 | End: 2021-03-22

## 2021-03-19 RX ORDER — CIPROFLOXACIN 500 MG/1
500 TABLET, FILM COATED ORAL EVERY 12 HOURS SCHEDULED
Status: DISCONTINUED | OUTPATIENT
Start: 2021-03-19 | End: 2021-03-19

## 2021-03-19 RX ORDER — TAMSULOSIN HYDROCHLORIDE 0.4 MG/1
0.4 CAPSULE ORAL DAILY
Qty: 30 CAPSULE | Refills: 0 | Status: SHIPPED | OUTPATIENT
Start: 2021-03-20 | End: 2021-03-19

## 2021-03-19 RX ORDER — TAMSULOSIN HYDROCHLORIDE 0.4 MG/1
0.4 CAPSULE ORAL DAILY
Qty: 7 CAPSULE | Refills: 0 | Status: SHIPPED | OUTPATIENT
Start: 2021-03-20 | End: 2021-03-22

## 2021-03-19 RX ORDER — ONDANSETRON 4 MG/1
4 TABLET, ORALLY DISINTEGRATING ORAL EVERY 8 HOURS PRN
Status: DISCONTINUED | OUTPATIENT
Start: 2021-03-19 | End: 2021-03-24

## 2021-03-19 RX ORDER — PSEUDOEPHEDRINE HCL 30 MG
100 TABLET ORAL 2 TIMES DAILY
Qty: 60 CAPSULE | Refills: 0 | Status: SHIPPED | OUTPATIENT
Start: 2021-03-19

## 2021-03-19 RX ORDER — MELATONIN
325
Qty: 30 TABLET | Refills: 0 | Status: SHIPPED | OUTPATIENT
Start: 2021-03-19

## 2021-03-19 RX ORDER — CIPROFLOXACIN 500 MG/1
500 TABLET, FILM COATED ORAL EVERY 12 HOURS SCHEDULED
Status: DISCONTINUED | OUTPATIENT
Start: 2021-03-19 | End: 2021-03-24

## 2021-03-19 RX ORDER — POLYETHYLENE GLYCOL 3350 17 G/17G
17 POWDER, FOR SOLUTION ORAL DAILY PRN
Qty: 30 EACH | Refills: 0 | Status: SHIPPED | OUTPATIENT
Start: 2021-03-19

## 2021-03-19 RX ORDER — CIPROFLOXACIN 500 MG/1
500 TABLET, FILM COATED ORAL EVERY 12 HOURS SCHEDULED
Qty: 12 TABLET | Refills: 0 | Status: SHIPPED | OUTPATIENT
Start: 2021-03-19 | End: 2021-03-22

## 2021-03-19 RX ORDER — PHENAZOPYRIDINE HYDROCHLORIDE 100 MG/1
100 TABLET, FILM COATED ORAL 3 TIMES DAILY PRN
Qty: 7 TABLET | Refills: 0 | Status: SHIPPED | OUTPATIENT
Start: 2021-03-19 | End: 2021-03-19

## 2021-03-19 NOTE — PHYSICAL THERAPY NOTE
Chart reviewed pt and consulted with RN who gave approval for treatment. Pt reported that she is \"just feeling off today and does not feel good enough to work with physical therapy. I just really need to sit here in peace for a bit. \" Pt was able to work

## 2021-03-19 NOTE — PROGRESS NOTES
[facility] Hospitalist Team  Progress Note    Ashkan Woody Patient Status:  Inpatient    1950 MRN [id number]   Location [facility] 4W/SW/SE Attending Henrique Stratton MD   [facility] Day # 9 PCP Brian Jovel     CC: Follow Up  PCP: Brian Jovel beds at [facility]. Niece working on alternative placement. Rojas out and urinating but complaining of burning on urination      OBJECTIVE:   Blood pressure 117/59, pulse 90, temperature 98.5 °F (36.9 °C), temperature source Oral, resp.  rate 19, height 5 Daily with breakfast  glucose (DEX4) oral liquid 15 g, 15 g, Oral, Q15 Min PRN   Or  Glucose-Vitamin C (DEX-4) chewable tab 4 tablet, 4 tablet, Oral, Q15 Min PRN   Or  dextrose 50 % injection 50 mL, 50 mL, Intravenous, Q15 Min PRN   Or  glucose (DEX4) oral nontender, nondistended, no organomegaly, bowel sounds present  MSK:  no clubbing, no cyanosis.  No Lower extremity edema  Skin: no rashes or lesions, well perfused  Psych: mood stable, cooperative  Neuro: no focal deficits     Assessment and Plan  S/P C4-

## 2021-03-19 NOTE — OCCUPATIONAL THERAPY NOTE
OCCUPATIONAL THERAPY TREATMENT NOTE - INPATIENT    Room Number: 435/435-A         Presenting Problem: : Myelopathy , spinal stenosis . Pt is s/p C 4 -T1 Laminectomy and fusion.    Pt with pre -existing hx of multiple falls, myelopathy , B Hand and feet numb bed and agreeable for OT session     OBJECTIVE  Precautions: Bed/chair alarm    WEIGHT BEARING RESTRICTION  Weight Bearing Restriction: None                PAIN ASSESSMENT  Rating: Unable to rate  Location:  (neck)  Management Techniques:  Activity promotio set    OT Goals:    Patient will complete LE dressing with Mod I  Comment: min assist , pt able to don clothing with cross leg technique.  Discussed use of  AE to assist.    Bradly Champion will complete toilet transfer with Mod I Min A with close chair follow w

## 2021-03-19 NOTE — PROGRESS NOTES
Spine Service Progress Note    Subjective: reports burning with urination yesterday.      Pain: controlled on orals   Denies F/C/N/V  Swallow: good  Speech: good  Appetite: decreased  BM: +Flatus/ +BM on 3/18/2021  Urination: burning with urination and od for UTI. Will review with Hospitalist team for empiric UTI treatment needs. Add pyridium for burning after specimen collection. Discharge plan: continue with current discharge plan. Monitor urination.   -Questions encouraged and answered.      Case review

## 2021-03-19 NOTE — PLAN OF CARE
Problem: Patient Centered Care  Goal: Patient preferences are identified and integrated in the patient's plan of care  Description: Interventions:  - What would you like us to know as we care for you?   - Provide timely, complete, and accurate informatio Administer growth factors as ordered  - Implement neutropenic guidelines  Outcome: Progressing     Problem: SAFETY ADULT - FALL  Goal: Free from fall injury  Description: INTERVENTIONS:  - Assess pt frequently for physical needs  - Identify cognitive and p PRN tylenol #3 with codeine. Tolerating diet. Call light within reach. Bed in lowest and locked position. Plan for [facility] or another facility pending insurance auth.

## 2021-03-19 NOTE — CM/SW NOTE
JOSE spoke with Tereza/ DAMI, no updates on insurance auth and no beds till middle of next week. JOSE informed Michael Starkey that we will now entertain dc to [facility]. Per RN on 9/15 pt has chosen [facility] as her back up plan.   JOSE reserved a bed for pt the

## 2021-03-20 LAB
GLUCOSE BLDC GLUCOMTR-MCNC: 104 MG/DL (ref 70–99)
GLUCOSE BLDC GLUCOMTR-MCNC: 119 MG/DL (ref 70–99)
GLUCOSE BLDC GLUCOMTR-MCNC: 122 MG/DL (ref 70–99)
GLUCOSE BLDC GLUCOMTR-MCNC: 96 MG/DL (ref 70–99)

## 2021-03-20 PROCEDURE — 97530 THERAPEUTIC ACTIVITIES: CPT

## 2021-03-20 PROCEDURE — 82962 GLUCOSE BLOOD TEST: CPT

## 2021-03-20 RX ORDER — FLUCONAZOLE 100 MG/1
200 TABLET ORAL ONCE
Status: COMPLETED | OUTPATIENT
Start: 2021-03-20 | End: 2021-03-20

## 2021-03-20 NOTE — PLAN OF CARE
Problem: Patient Centered Care  Goal: Patient preferences are identified and integrated in the patient's plan of care  Description: Interventions:  - Provide timely, complete, and accurate information to patient/family  - Incorporate patient and family k and pre-medicate as appropriate  Outcome: Progressing     Problem: RISK FOR INFECTION - ADULT  Goal: Absence of fever/infection during anticipated neutropenic period  Description: INTERVENTIONS  - Monitor WBC  - Administer growth factors as ordered  - Impl all times, but PA states can remove intermittently for cleaning or other purposes for no more than 30 min at a time. MD Sundeep Riojas notified and aware pt complaining of both burning with urination and burning on inner labia (\"vagina\" per patient).  MD rios o

## 2021-03-20 NOTE — PROGRESS NOTES
[facility] Hospitalist Progress Note     CC: Hospital Follow up    PCP: Rafiq Gooden       Assessment/Plan:     Active Problems:    Spinal stenosis    Myelopathy ([facility] [address]    Cervical myelopathy [facility])  Patient is a 77 yo female with hx gastric bypass, b12 def, anem temperature 97.9 °F (36.6 °C), temperature source Oral, resp. rate 20, height 5' 7\" (1.702 m), weight 158 lb (71.7 kg), SpO2 96 %.     Temp:  [97.9 °F (36.6 °C)-98.2 °F (36.8 °C)] 97.9 °F (36.6 °C)  Pulse:  [69-92] 76  Resp:  [16-20] 20  BP: ()/(58-6 mg Oral QAM AC   • ferrous sulfate  325 mg Oral Daily with breakfast   • Insulin Aspart Pen  1-7 Units Subcutaneous TID CC   • Senna  17.2 mg Oral Nightly   • docusate sodium  100 mg Oral BID   • magnesium hydroxide  30 mL Oral Q8H   • Insulin Degludec  10

## 2021-03-20 NOTE — PROGRESS NOTES
Spine Service Progress Note    Subjective: reports with irritation between her legs. Continues with left hand weakness.      Pain: controlled 6/10   Denies F/C/N/V  Swallow: good  Speech: good  Appetite: good  BM: +Flatus/ +BM   Urination: improved, sandie by hospitilast and pending PT/OT clearance. If continues in hospital tomorrow, plan to review incision site for possible suture removal tomorrow prior to discharge. -Questions encouraged and answered.      Reviewed case with Dr. Hillary Burleson for plan of care

## 2021-03-20 NOTE — PHYSICAL THERAPY NOTE
PHYSICAL THERAPY TREATMENT NOTE - INPATIENT     Room Number: 435/435-A       Presenting Problem: Myelopathy , spinal stenosis . Pt is s/p C 4 -T1 Laminectomy and fusion. Pt with pre -existing hx of multiple falls, myelopathy , B Hand and feet numbness. Static Sitting: Fair  Dynamic Sitting: Fair           Static Standing: Fair -  Dynamic Standing: Poor +      AM-PAC '6-Clicks' INPATIENT SHORT FORM - BASIC MOBILITY  How much difficulty does the patient currently have. ..   - negotiate 10  stairs/one curb w/ assistive device min assist       Goal #4   Current Status NT   Goal #5 Pt to verbalize spine precautions and maintain proper spine sparing sequencing with fxn mobility      Goal #5   Current Status IN PROGRESS   Goal #6 Fa

## 2021-03-21 LAB
GLUCOSE BLDC GLUCOMTR-MCNC: 139 MG/DL (ref 70–99)
GLUCOSE BLDC GLUCOMTR-MCNC: 139 MG/DL (ref 70–99)
GLUCOSE BLDC GLUCOMTR-MCNC: 155 MG/DL (ref 70–99)
GLUCOSE BLDC GLUCOMTR-MCNC: 156 MG/DL (ref 70–99)
GLUCOSE BLDC GLUCOMTR-MCNC: 80 MG/DL (ref 70–99)

## 2021-03-21 PROCEDURE — 82962 GLUCOSE BLOOD TEST: CPT

## 2021-03-21 NOTE — PROGRESS NOTES
Spine Service Progress Note    Subjective: Sitting in chair. Collar off.      Pain: controlled on orals   Denies F/C/N/V  Swallow: good  Speech: good  Appetite: hungry  BM: +Flatus/ +BM   Urination: well      Objective:   BP 97/56 (BP Location: Right arm) after discharge. -Questions encouraged and answered. Case reviewed with Dr. Jahaira Jensen for plan of care.      JONEL Arzate  Division of Spine Surgery  [facility], [address]

## 2021-03-21 NOTE — PLAN OF CARE
Problem: Patient Centered Care  Goal: Patient preferences are identified and integrated in the patient's plan of care  Description: Interventions:  - Provide timely, complete, and accurate information to patient/family  - Incorporate patient and family k fever/infection during anticipated neutropenic period  Description: INTERVENTIONS  - Monitor WBC  - Administer growth factors as ordered  - Implement neutropenic guidelines  Outcome: Progressing     Problem: SAFETY ADULT - FALL  Goal: Free from fall injury changed at this time. F/u appt on Tuesday to be cancelled, f/u w Dr. Natalie Merrill office to be in 1 week. Discharge plan pending insurance auth. Continue to monitor. 1800: Patient complains of no more vaginal burning, nor burning with urination.  Continue to

## 2021-03-22 LAB
ANION GAP SERPL CALC-SCNC: 3 MMOL/L (ref 0–18)
BASOPHILS # BLD AUTO: 0.03 X10(3) UL (ref 0–0.2)
BASOPHILS NFR BLD AUTO: 0.4 %
BUN BLD-MCNC: 23 MG/DL (ref 7–18)
BUN/CREAT SERPL: 25.6 (ref 10–20)
CALCIUM BLD-MCNC: 9 MG/DL (ref 8.5–10.1)
CHLORIDE SERPL-SCNC: 101 MMOL/L (ref 98–112)
CO2 SERPL-SCNC: 30 MMOL/L (ref 21–32)
CREAT BLD-MCNC: 0.9 MG/DL
DEPRECATED RDW RBC AUTO: 38 FL (ref 35.1–46.3)
EOSINOPHIL # BLD AUTO: 0.19 X10(3) UL (ref 0–0.7)
EOSINOPHIL NFR BLD AUTO: 2.2 %
ERYTHROCYTE [DISTWIDTH] IN BLOOD BY AUTOMATED COUNT: 10.6 % (ref 11–15)
GLUCOSE BLD-MCNC: 149 MG/DL (ref 70–99)
GLUCOSE BLDC GLUCOMTR-MCNC: 120 MG/DL (ref 70–99)
GLUCOSE BLDC GLUCOMTR-MCNC: 157 MG/DL (ref 70–99)
GLUCOSE BLDC GLUCOMTR-MCNC: 163 MG/DL (ref 70–99)
GLUCOSE BLDC GLUCOMTR-MCNC: 172 MG/DL (ref 70–99)
GLUCOSE BLDC GLUCOMTR-MCNC: 175 MG/DL (ref 70–99)
HAV IGM SER QL: 2.2 MG/DL (ref 1.6–2.6)
HCT VFR BLD AUTO: 26.6 %
HGB BLD-MCNC: 8.7 G/DL
IMM GRANULOCYTES # BLD AUTO: 0.04 X10(3) UL (ref 0–1)
IMM GRANULOCYTES NFR BLD: 0.5 %
LYMPHOCYTES # BLD AUTO: 1.3 X10(3) UL (ref 1–4)
LYMPHOCYTES NFR BLD AUTO: 15.4 %
MCH RBC QN AUTO: 31.4 PG (ref 26–34)
MCHC RBC AUTO-ENTMCNC: 32.7 G/DL (ref 31–37)
MCV RBC AUTO: 96 FL
MONOCYTES # BLD AUTO: 0.53 X10(3) UL (ref 0.1–1)
MONOCYTES NFR BLD AUTO: 6.3 %
NEUTROPHILS # BLD AUTO: 6.37 X10 (3) UL (ref 1.5–7.7)
NEUTROPHILS # BLD AUTO: 6.37 X10(3) UL (ref 1.5–7.7)
NEUTROPHILS NFR BLD AUTO: 75.2 %
OSMOLALITY SERPL CALC.SUM OF ELEC: 284 MOSM/KG (ref 275–295)
PLATELET # BLD AUTO: 510 10(3)UL (ref 150–450)
POTASSIUM SERPL-SCNC: 5 MMOL/L (ref 3.5–5.1)
RBC # BLD AUTO: 2.77 X10(6)UL
SODIUM SERPL-SCNC: 134 MMOL/L (ref 136–145)
WBC # BLD AUTO: 8.5 X10(3) UL (ref 4–11)

## 2021-03-22 PROCEDURE — 83735 ASSAY OF MAGNESIUM: CPT | Performed by: INTERNAL MEDICINE

## 2021-03-22 PROCEDURE — 80048 BASIC METABOLIC PNL TOTAL CA: CPT | Performed by: INTERNAL MEDICINE

## 2021-03-22 PROCEDURE — 82962 GLUCOSE BLOOD TEST: CPT

## 2021-03-22 PROCEDURE — 97116 GAIT TRAINING THERAPY: CPT

## 2021-03-22 PROCEDURE — 85025 COMPLETE CBC W/AUTO DIFF WBC: CPT | Performed by: INTERNAL MEDICINE

## 2021-03-22 PROCEDURE — 97530 THERAPEUTIC ACTIVITIES: CPT

## 2021-03-22 RX ORDER — CHOLECALCIFEROL (VITAMIN D3) 125 MCG
500 CAPSULE ORAL DAILY
Status: DISCONTINUED | OUTPATIENT
Start: 2021-03-22 | End: 2021-03-24

## 2021-03-22 RX ORDER — CIPROFLOXACIN 500 MG/1
500 TABLET, FILM COATED ORAL EVERY 12 HOURS SCHEDULED
Qty: 6 TABLET | Refills: 0 | Status: SHIPPED | COMMUNITY
Start: 2021-03-23 | End: 2021-03-24

## 2021-03-22 RX ORDER — CALCIUM CARBONATE 200(500)MG
500 TABLET,CHEWABLE ORAL DAILY
Status: DISCONTINUED | OUTPATIENT
Start: 2021-03-22 | End: 2021-03-24

## 2021-03-22 NOTE — PLAN OF CARE
Problem: Patient Centered Care  Goal: Patient preferences are identified and integrated in the patient's plan of care  Description: Interventions:  - What would you like us to know as we care for you?   - Provide timely, complete, and accurate informatio distraction and/or relaxation techniques  - Monitor for opioid side effects  - Notify MD/LIP if interventions unsuccessful or patient reports new pain  - Anticipate increased pain with activity and pre-medicate as appropriate  Outcome: Progressing     Prob functional status, cognitive ability or social support system  Outcome: Progressing     Alert and oriented x4. Denies reports of pain related to surgery, discomfort experienced from UTI symptoms burning r/d urination. Oral pyridium and cipro given.  Patient

## 2021-03-22 NOTE — PLAN OF CARE
Declines pain medication, states pain is tolerable. Voiding freely. Up 2/walker. Plan to d/c to subacute rehab when bed available.     Problem: Patient Centered Care  Goal: Patient preferences are identified and integrated in the patient's plan of care  [place] - ADULT  Goal: Absence of fever/infection during anticipated neutropenic period  Description: INTERVENTIONS  - Monitor WBC  - Administer growth factors as ordered  - Implement neutropenic guidelines  Outcome: Progressing     Problem: SAFETY ADULT - FALL  G

## 2021-03-22 NOTE — PHYSICAL THERAPY NOTE
PHYSICAL THERAPY TREATMENT NOTE - INPATIENT     Room Number: 435/435-A       Presenting Problem: Myelopathy , spinal stenosis . Pt is s/p C 4 -T1 Laminectomy and fusion. Pt with pre -existing hx of multiple falls, myelopathy , B Hand and feet numbness. Static Sitting: Fair +  Dynamic Sitting: Fair +           Static Standing: Fair -  Dynamic Standing: Poor +    ACTIVITY TOLERANCE                         O2 WALK                  AM-PAC '6-Clicks' INPATIENT S w/ RW & MOD A x 1   Goal #4 Patient will negotiate 10  stairs/one curb w/ assistive device min assist       Goal #4   Current Status NT   Goal #5 Pt to verbalize spine precautions and maintain proper spine sparing sequencing with fxn mobility      Goal #5

## 2021-03-22 NOTE — CM/SW NOTE
900: CM left VM for [facility]. Awaiting call back. 1035: CM left VM for [facility]. Awaiting call back. Faith Johnson.  Yoshi Albrecht RN, BSN  Nurse   [phone]

## 2021-03-22 NOTE — PROGRESS NOTES
[facility] Hospitalist Team  Progress Note    Fred Willa Patient Status:  Inpatient    1950 MRN [id number]   Location [facility] 4W/SW/SE Attending Windsor Dancer, MD   Hosp Day # 12 PCP Merivan Dose     CC: Follow Up  PCP: Justin Easton Jeremy Lpoez RN, NP   [address] [facility] Hospitalist Team  Pager [phone]   Answering Service number: [phone]  3/22/2021    SUBJECTIVE:   No complaints.  Waiting on insurance auth for rehab    OBJECTIVE:   Blood pressure 119/75, pulse 80 (TYLENOL EXTRA STRENGTH) tab 1,000 mg, 1,000 mg, Oral, Q8H  traMADol HCl (ULTRAM) tab 50 mg, 50 mg, Oral, Q6H PRN  Meclizine HCl (ANTIVERT) tab 12.5 mg, 12.5 mg, Oral, TID PRN  Pantoprazole Sodium (PROTONIX) EC tab 20 mg, 20 mg, Oral, QAM AC  ferrous sulfa 158 lb (71.7 kg)   SpO2 99%   BMI 24.75 kg/m²     Gen: No acute distress, alert and oriented x3  Neck brace in place  Pulm: Lungs clear, normal respiratory effort   CV: Heart with regular rate and rhythm   Abd: Abdomen soft, nontender, nondistended   MSK:

## 2021-03-23 LAB
GLUCOSE BLDC GLUCOMTR-MCNC: 128 MG/DL (ref 70–99)
GLUCOSE BLDC GLUCOMTR-MCNC: 155 MG/DL (ref 70–99)
GLUCOSE BLDC GLUCOMTR-MCNC: 193 MG/DL (ref 70–99)
GLUCOSE BLDC GLUCOMTR-MCNC: 206 MG/DL (ref 70–99)

## 2021-03-23 PROCEDURE — 97116 GAIT TRAINING THERAPY: CPT

## 2021-03-23 PROCEDURE — 97530 THERAPEUTIC ACTIVITIES: CPT

## 2021-03-23 PROCEDURE — 82962 GLUCOSE BLOOD TEST: CPT

## 2021-03-23 NOTE — PROGRESS NOTES
Spine Service Progress Note    Subjective: Reports increase in bilateral arm paresthesia, bilateral triceps area decrease in sensation and left arm to digits 3-5 decrease sensation. With locking sensation when using her digits of her left hand.  Continues given recent history of spine surgery and risk for formation of epidural hematoma.  May resume chemical prophylaxis if needed 5 days post-operative.   -Discussed continuing her home bowel regiment with her docusate, PRN senokot and miralax   -Continue daily

## 2021-03-23 NOTE — PROGRESS NOTES
[facility] Hospitalist Team  Progress Note    Germanluma Valles Patient Status:  Inpatient    1950 MRN [id number]   Location [facility] 4W/SW/SE Attending Esther Smith MD   [facility] Day # 15 PCP Kanwal Moore     CC: Follow Up  PCP: Kanwal Mooer Jon Russo RN, NP   [facility] Hospitalist Team  Pager [phone]   Answering Service number: [phone]  3/23/2021    SUBJECTIVE:   Some neck pain.  Still waiting on insurance auth  For rehab    OBJECTIVE:   Blood pressure 110/66, (TYLENOL #3) 300-30 MG tab 1 tablet, 1 tablet, Oral, Q6H PRN  acetaminophen (TYLENOL EXTRA STRENGTH) tab 1,000 mg, 1,000 mg, Oral, Q8H  traMADol HCl (ULTRAM) tab 50 mg, 50 mg, Oral, Q6H PRN  Meclizine HCl (ANTIVERT) tab 12.5 mg, 12.5 mg, Oral, TID PRN  Pan Pulse 78   Temp 98.3 °F (36.8 °C) (Oral)   Resp 18   Ht 5' 7\" (1.702 m)   Wt 158 lb (71.7 kg)   SpO2 99%   BMI 24.75 kg/m²     Gen: No acute distress, alert and oriented x3  Neck brace in place  Pulm: Lungs clear, normal respiratory effort   CV: Heart wi

## 2021-03-23 NOTE — CM/SW NOTE
Plan at this time is for the pt. To rehab at [facility]. RAUDEL sat and spoke with the pt. And she is aware and agreeable. Insurance Vassie Marchi is still pending at this time. RAUDEL did explain if Vassie Marchi is received the pt. Is ready for discharge.       Kartik Polk

## 2021-03-23 NOTE — PHYSICAL THERAPY NOTE
PHYSICAL THERAPY TREATMENT NOTE - INPATIENT     Room Number: 435/435-A       Presenting Problem: Myelopathy , spinal stenosis . Pt is s/p C 4 -T1 Laminectomy and fusion. Pt with pre -existing hx of multiple falls, myelopathy , B Hand and feet numbness. Static Sitting: Fair  Dynamic Sitting: Fair -           Static Standing: Poor +  Dynamic Standing: Poor +                 AM-PAC '6-Clicks' INPATIENT SHORT Chair/Wheelchair at assistance level:CGA / minimum assistance with walker - rolling      Goal #2  Current Status MIN A   Goal #3 Patient is able to ambulate 100-150 feet with assist device: walker - rolling at assistance level:CGA/  minimum assistance

## 2021-03-24 VITALS
TEMPERATURE: 98 F | HEART RATE: 80 BPM | OXYGEN SATURATION: 97 % | HEIGHT: 67 IN | SYSTOLIC BLOOD PRESSURE: 118 MMHG | WEIGHT: 158 LBS | BODY MASS INDEX: 24.8 KG/M2 | DIASTOLIC BLOOD PRESSURE: 72 MMHG | RESPIRATION RATE: 16 BRPM

## 2021-03-24 LAB
GLUCOSE BLDC GLUCOMTR-MCNC: 160 MG/DL (ref 70–99)
GLUCOSE BLDC GLUCOMTR-MCNC: 227 MG/DL (ref 70–99)

## 2021-03-24 PROCEDURE — 82962 GLUCOSE BLOOD TEST: CPT

## 2021-03-24 PROCEDURE — 97116 GAIT TRAINING THERAPY: CPT

## 2021-03-24 PROCEDURE — 97530 THERAPEUTIC ACTIVITIES: CPT

## 2021-03-24 RX ORDER — CIPROFLOXACIN 500 MG/1
500 TABLET, FILM COATED ORAL EVERY 12 HOURS SCHEDULED
Qty: 6 TABLET | Refills: 0 | Status: SHIPPED | COMMUNITY
Start: 2021-03-24

## 2021-03-24 NOTE — PHYSICAL THERAPY NOTE
PHYSICAL THERAPY TREATMENT NOTE - INPATIENT     Room Number: 435/435-A       Presenting Problem: Myelopathy , spinal stenosis . Pt is s/p C 4 -T1 Laminectomy and fusion. Pt with pre -existing hx of multiple falls, myelopathy , B Hand and feet numbness. Static Sitting: Fair  Dynamic Sitting: Fair -           Static Standing: Poor +  Dynamic Standing: Fair +    ACTIVITY TOLERANCE                         O2 WALK                  AM-PAC '6-Clicks' I execute correctly   Goal #2 Patient is able to demonstrate transfers EOB to/from Chair/Wheelchair at assistance level:CGA / minimum assistance with walker - rolling      Goal #2  Current Status CG sit to stand   Goal #3 Patient is able to ambulate 100-150

## 2021-03-24 NOTE — CM/SW NOTE
The pt's insurance has approved transfer to rehab. The pt. Is scheduled to discharge to [facility] today 3/24 at 1p, via ambulance, due to pain issues. The pt. And her sister Mayco Norwood are aware of discharge.       PCS is complete in Epic, RN to print Medications

## 2021-03-24 NOTE — PLAN OF CARE
Problem: Patient Centered Care  Goal: Patient preferences are identified and integrated in the patient's plan of care  Description: Interventions:  - What would you like us to know as we care for you?   - Provide timely, complete, and accurate informatio distraction and/or relaxation techniques  - Monitor for opioid side effects  - Notify MD/LIP if interventions unsuccessful or patient reports new pain  - Anticipate increased pain with activity and pre-medicate as appropriate  Outcome: Progressing     Prob functional status, cognitive ability or social support system  Outcome: Progressing   No acute changes overnight. A&Ox4. Vital signs stable. Denies pain overnight. Neck collar in place. Tolerating diet, blood sugar monitored. Possible discharge today.

## 2021-03-24 NOTE — PLAN OF CARE
Problem: Patient Centered Care  Goal: Patient preferences are identified and integrated in the patient's plan of care  Description: Interventions:  - What would you like us to know as we care for you?  - Provide timely, complete, and accurate information Manage/alleviate anxiety  - Utilize distraction and/or relaxation techniques  - Monitor for opioid side effects  - Notify MD/LIP if interventions unsuccessful or patient reports new pain  - Anticipate increased pain with activity and pre-medicate as approp services based on physician/LIP order or complex needs related to functional status, cognitive ability or social support system  Outcome: Adequate for Discharge   Dressing to posterior neck changed as ordered. [place] collar maintained at all times.  Pt ambul

## 2021-03-24 NOTE — DISCHARGE SUMMARY
[facility] Hospitalist Discharge Summary   Patient ID:  Kady Reis  [id number]  34 year old  12/23/1950    Admit date: 3/10/2021  Discharge date: 3/24/2021    Primary Care Physician: Lauren Dyer   Attending Physician: Easton Leslie MD   Consults:   Girard Harada novolog 5-10 units tid, tresiba 10 units nightly  -discharge  tresiba at 10 unit nightly, 6 aspart TID, plus trulicity  -accuchecks  -ADA diet      GERD  -omeprazole      EXAM:   GENERAL: no apparent distress  NEURO: A/A Ox3  HEENT:  Collar in place with d known as: HIBICLENS     HM LIDOCAINE PATCH EX     HYDROcodone-acetaminophen  MG Tabs  Commonly known as: Norco     methocarbamol 500 MG Tabs  Commonly known as: ROBAXIN     Mirabegron ER 50 MG Tb24     PRENATAL VITAMIN PLUS LOW IRON OR           Wher

## 2021-03-24 NOTE — PROGRESS NOTES
[facility] Hospitalist Team  Progress Note    Meme Cole Patient Status:  Inpatient    1950 MRN [id number]   Location [facility] 4W/SW/SE Attending Silvano Ramon MD   [facility] Day # 14 PCP Ashley Shin     CC: Follow Up  PCP: Ashley Shin Discussed plan of care with Dr. Frederick Storm RN, NP   [facility] Hospitalist Team  Pager [phone]   Answering Service number: [phone]  3/24/2021    SUBJECTIVE:   Feeling frustrated want to walk more and walk on her own.  Alessandro #3) 300-30 MG tab 1 tablet, 1 tablet, Oral, Q6H PRN  acetaminophen (TYLENOL EXTRA STRENGTH) tab 1,000 mg, 1,000 mg, Oral, Q8H  traMADol HCl (ULTRAM) tab 50 mg, 50 mg, Oral, Q6H PRN  Meclizine HCl (ANTIVERT) tab 12.5 mg, 12.5 mg, Oral, TID PRN  Pantoprazole

## 2021-03-26 NOTE — PAYOR COMM NOTE
--------------  DISCHARGE REVIEW    Diamond Snyder [place] [facility]  Subscriber #:  [id number]  Authorization Number: [id number]    Admit date: 3/10/21  Admit time:   5:53 AM  Discharge Date: 3/24/2021  1:46 PM     Admitting Physician: Marleni Maurice MD  Attending Physi removed as well as Proteus UTI. Pt discharged to rehab, see below for details      S/P C4-T1 Laminectomy and Fusion  -prn pain meds-trial T#3  -follow up with Dr Mely Fernandez      Acute on Chronic Anemia  2/2 Blood Loss, B12 def and ?  Iron Def  -pre op hgb 10.6,blo Sennosides 17.2 MG Tabs  Take 1 tablet (17.2 mg total) by mouth nightly. CHANGE how you take these medications    Insulin Aspart Pen 100 UNIT/ML Sopn  Commonly known as: Courtney Ruelas  What changed: See the new instructions.      Elvira Finn FlexTouch 100 UN current and discharge medications on day of discharge  Patient had opportunity to ask questions and state understand and agree with therapeutic plan as outlined    Manjeet Alexander RN, NP   [facility] Team  Pager [phone]  Answering Service

## 2023-11-19 NOTE — CM/SW NOTE
SW received MDO for DC planning. SW met with pt at bedside to discuss initial assessment and plan. Per PT notes pt lives in an apt alone with 10 steps to enter. Pt lives alone but has her sister who is able to assist as needed.  Pt's sister is not avail No

## (undated) DEVICE — Device

## (undated) DEVICE — FLOSEAL HEMOSTATIC MATRIX, 5ML: Brand: FLOSEAL HEMOSTATIC MATRIX

## (undated) DEVICE — GAMMEX® PI HYBRID SIZE 7, STERILE POWDER-FREE SURGICAL GLOVE, POLYISOPRENE AND NEOPRENE BLEND: Brand: GAMMEX

## (undated) DEVICE — INSULATED BLADE ELECTRODE 6.5

## (undated) DEVICE — KIT DRN 1/8IN PVC 3 SPRG EVAC

## (undated) DEVICE — COLLAR CRV ADLT SHRT CHIN REST

## (undated) DEVICE — 450 ML BOTTLE OF 0.05% CHLORHEXIDINE GLUCONATE IN 99.95% STERILE WATER FOR IRRIGATION, USP AND APPLICATOR.: Brand: IRRISEPT ANTIMICROBIAL WOUND LAVAGE

## (undated) DEVICE — SPONGE NEURO 1/2X1-1/2

## (undated) DEVICE — SUTURE VICRYL 0 CT-1

## (undated) DEVICE — GAMMEX® PI HYBRID SIZE 7.5, STERILE POWDER-FREE SURGICAL GLOVE, POLYISOPRENE AND NEOPRENE BLEND: Brand: GAMMEX

## (undated) DEVICE — 3M™ MEDITPORE™ SOFT CLOTH TAPE 6 IN X 10 YD 12 ROLLS/CASE 2966: Brand: 3M™ MEDIPORE™

## (undated) DEVICE — SUTURE ETHILON 3-0 669H

## (undated) DEVICE — NVM5 MULTIMODALITY SURFACE KIT

## (undated) DEVICE — DEV STRATAFIX MNCRL + SUTR 2-0

## (undated) DEVICE — CAUTERY BLADE 2IN INS E1455

## (undated) DEVICE — DEV STRATAFIX PDS + SUTR 0 CTX

## (undated) DEVICE — X-RAY DETECTABLE SPONGES,16 PLY: Brand: VISTEC

## (undated) DEVICE — OCCLUSIVE GAUZE STRIP,3% BISMUTH TRIBROMOPHENATE IN PETROLATUM BLEND: Brand: XEROFORM

## (undated) DEVICE — PACK SRG UNV 1 STRL LF

## (undated) DEVICE — ADHESIVE MASTISOL 2/3CC VL

## (undated) DEVICE — 11.1-8GX20.3CMBEVTIPPININTRODU

## (undated) DEVICE — GAMMEX® NON-LATEX PI ORTHO SIZE 7.5, STERILE POLYISOPRENE POWDER-FREE SURGICAL GLOVE: Brand: GAMMEX

## (undated) DEVICE — CATH IV 18GA .0500-.0530IN

## (undated) DEVICE — C-ARMOR C-ARM EQUIPMENT COVERS CLEAR STERILE UNIVERSAL FIT 12 PER CASE: Brand: C-ARMOR

## (undated) DEVICE — PRONEVIEW CUSHION INSERTS LRG

## (undated) DEVICE — CONTAINER SPEC STR 4OZ GRY LID

## (undated) DEVICE — LAMINECTOMY: Brand: MEDLINE INDUSTRIES, INC.

## (undated) DEVICE — 2.0MM NEURO (MATCH HEAD) SOFT TOUCH

## (undated) DEVICE — SUTURE VICRYL 2-0 CT-2

## (undated) DEVICE — GAMMEX® PI HYBRID SIZE 8, STERILE POWDER-FREE SURGICAL GLOVE, POLYISOPRENE AND NEOPRENE BLEND: Brand: GAMMEX

## (undated) DEVICE — DRAPE SLUSH/WARMER W/DISC

## (undated) DEVICE — SOL  .9 1000ML BTL

## (undated) DEVICE — PEN: MARKING STD PT 100/CS: Brand: MEDICAL ACTION INDUSTRIES

## (undated) NOTE — IP AVS SNAPSHOT
[facility]            (For Outpatient Use Only) Initial Admit Date: 3/10/2021   Inpt/Obs Admit Date: Inpt: 3/10/21 / Obs: N/A   Discharge Date:    Skip Portillo:  [de-identified]   MRN: [de-identified]   CSN: [id number]   CEID: [id number]        RL Subscriber ID:  Pt Rel to Subscriber:    Hospital Account Financial Class: Medicare Advantage    March 24, 2021

## (undated) NOTE — IP AVS SNAPSHOT
Patient Demographics     Address  [address] [place] [id number] Phone  [phone] [facility])  [phone] (Mobile) *Preferred* E-mail Address  [email or web address]      Emergency Contact(s)     Name Relation Home Work [facility] as possible for a visit in 2 weeks.     Specialty: Surgery, Spine  Why: hospital follow up  Contact information:  [address]                   Your medication list      TAKE these medications       Instruc KARELY Brandt Anoop FlexTouch 100 UNIT/ML Sopn  Generic drug: Insulin Degludec      Inject 10 Units into the skin daily.    Celeste Dorado NP         Trulicity 6.53 FK/9.9JV Sopn  Generic drug: Dulaglutide  Notes to patient: Every monday Medication Name Action Time Action Reason Comments    [id number] Insulin Aspart Pen (NOVOLOG) 100 UNIT/ML flexpen 1-7 Units 03/23/21 1912 Given      [id number] Insulin Aspart Pen (NOVOLOG) 100 UNIT/ML flexpen 1-7 Units 03/24/21 1237 Given      [id number] Insu Meropenem <=0.25  Sensitive     Nitrofurantoin 128  Resistant     Piperacillin + Tazobactam <=4  Sensitive     Trimethoprim/Sulfa <=20  Sensitive              Linear View                        H&P - H&P Note      H&P signed by Mark Haque MD at 3/10/2021 were discussed with patient. Patient agrees with plan as detailed above.  Discussed plan of care with [GB.1] Bonnie[GB.3]     Briseida Muñoz RN, NP  [address] [facility] Team  Pager [phone]  Answering Service number: [phone]  3/10/2021 BYPASS/MIESHA-EN-Y     • OOPHORECTOMY Right    • REMOVAL FB SKIN/AREOLAR TISS          ALL:    Demerol Hcl [Meperi*    SWELLING  Statins                 OTHER (SEE COMMENTS)    Comment:Cant raise my arms  Insulin Aspart          ITCHING    Comment:Cannot quan of Systems  A comprehensive 10 point review of systems was completed. Pertinent positives and negatives noted in the the HPI. DIAGNOSTIC DATA:   CBC/Chem  No results for input(s): WBC, HGB, MCV, PLT, BAND, INR in the last 168 hours.     Invalid input( now 9.4[GB.3]  -unable to due work up as pt received blood in OR   -continue b12, add iron   -? Iron def with hx of gastric bypass  -follow up PCP[GB.1] as oupt[GB. 3]    DM Type II  -HgbA1C pending  -home regimen: trulicity, novolog 8-98 units tid, Rodo Mcnulty -pre op hgb 10.6[GB.1],blood loss in OR 800cc[GB.3]--> hgb[GB. 1] in OR[GB.3] 7.2 S/P[GB.1] 1 unit[GB. 3] PRBC[GB.1]'[GB.3]s[GB.1], hgb now 9.4[GB.3]  -unable to due work up as pt received blood in OR   -continue b12, add iron   -?  Iron def with hx of [facility] legs except left arm with arterial line[GB. 3]    SKIN: no rashes  HEENT:[GB.1] cervical collar[GB. 3]  NECK: supple  RESPIRATORY: normal expansion; non labored, CTA   CARDIOVASCULAR: regular,nl S1 S2  ABDOMEN:  Soft, BS+; non distended, non tender    GENITA back and neck, Disp: 1 Bottle, Rfl: 0  Insulin Aspart Pen 100 UNIT/ML Subcutaneous Solution Pen-injector, Inject 5-10 units under the skin three times a day with meals, Disp: , Rfl:   TRULICITY 9.12 ID/3.7PZ Subcutaneous Solution Pen-injector, Inject 0.75 NP (Nurse Practitioner) filed at 3/10/2021  1:14 PM       [facility] Hospitalist Team  Consult  Admit Date:  3/10/21    ASSESSMENT / PLAN:   79 yo female with hx gastric bypass, b12 def, anemia, HL-statin intolerant, GERD, OAB, DM type II and severe cervical sten cervical stenosis[GB. 1]/myelopathy[GB. 2] who is S/P C4-T1 laminectomy and fusion[GB.1]    Pt seen in recovery room. Groggy, when awakened pt stating \"help me\" . She is moving all arms and legs except left arm with arterial line but not to command.  Pt got OR), Take 1 tablet by mouth daily. , Disp: , Rfl:   Insulin Degludec (TRESIBA FLEXTOUCH) 100 UNIT/ML Subcutaneous Solution Pen-injector, Inject 10 Units into the skin daily. , Disp: , Rfl:   latanoprost 0.005 % Ophthalmic Solution, Place 1 drop into both eye Radiology: No results found. SEE ATTENDING NOTE BELOW[GB.1]          Electronically signed by Prashant Swartz NP on 3/10/2021  1:14 PM   Attribution Key    GB. 1 - Prashant Swartz NP on 3/10/2021 11:09 AM  GB. 2 - Prashant Swartz NP on 3/10/2021 12:3 unit PRBC's in OR and acute urinary retention S/P owens, now removed as well as Proteus UTI.[GB.3] Pt discharged to rehab, see below for details      S/P C4-T1 Laminectomy and Fusion  -prn pain meds-trial T#3  -follow up with Dr Ynes Cramer      Acute on Chronic A Take 1 tablet (325 mg total) by mouth daily with breakfast.     PEG 3350 17 g Pack  Commonly known as: MIRALAX  Take 17 g by mouth daily as needed.      Phenazopyridine HCl 100 MG Tabs  Commonly known as: PYRIDIUM  Take 1 tablet (100 mg total) by mouth 3 (t rehab  Contact information:  [address] [facility]             Yang Painting MD. Schedule an appointment as soon as possible for a visit in 2 weeks.     Specialty: Surgery, Spine  Why: hospital follow up  Contact in stenosis . Pt is s/p C 4 -T1 Laminectomy and fusion. Pt with pre -existing hx of multiple falls, myelopathy , B Hand and feet numbness.        Problem List  Active Problems:    Spinal stenosis    Myelopathy (HCC)    Cervical myelopathy (HCC)      PHYSICAL Static Standing: Poor +  Dynamic Standing: Fair +    ACTIVITY TOLERANCE                         O2 WALK                  AM-PAC '6-Clicks' INPATIENT SHORT FORM - BASIC MOBILITY  How much difficulty does the patient currently have. ..  -   Turning over in be level:CGA / minimum assistance with walker - rolling      Goal #2  Current Status CG sit to stand   Goal #3 Patient is able to ambulate 100-150 feet with assist device: walker - rolling at assistance level:CGA/  minimum assistance      Goal #3   Current St gait, difficulty using rw correctly, slight post loss of balance. Pt with decreased ability to follow commands this session, motor planning difficulty? Pt instructed in spine precautions, pt understood.  Pt expressed frustration regarding hospitalization a bed to a chair (including a wheelchair)?: A Lot   -   Need to walk in hospital room?: A Lot   -   Climbing 3-5 steps with a railing?: Total[NORBERT.2]     AM-PAC Score:[NORBERT.1]  Raw Score: 14   Approx Degree of Impairment: 61.29%   Standardized Score (AM-PAC Sc plan , if able to progress to home.      Goal #6  Current Status NT[NORBERT.1]          Attribution Key    NORBERT. 1 - Meera Gramajo, PT on 3/23/2021 12:59 PM  NORBERT. 2 - Meera Gramajo, PT on 3/23/2021  1:01 PM  NORBERT. 3 - Meera Gramajo, PT on 3/23/2021  3 Recommendations: Sub-acute rehabilitation[ST.2]     PLAN[ST.1]  PT Treatment Plan: Bed mobility; Body mechanics; Coordination; Endurance; Energy conservation;Patient education; Family education;Gait training;Strengthening;Stoop training;Stair training;Transfer Patient End of Session: Up in chair;Needs met;Call light within reach;RN aware of session/findings; All patient questions and concerns addressed; Alarm set[ST. 2]    CURRENT GOALS   Goals to be met by:  2 weeks (3/25/2021)   Patient Goal Patient's self- Description: Patient's Long Term Goal: ***    Interventions:  - ***  - See additional Care Plan goals for specific interventions   Patient/Family Short Term Goal     Interdisciplinary Progressing    Description: Patient's Short Term Goal: ***    Interventi